# Patient Record
Sex: FEMALE | Race: AMERICAN INDIAN OR ALASKA NATIVE | NOT HISPANIC OR LATINO | Employment: OTHER | URBAN - NONMETROPOLITAN AREA
[De-identification: names, ages, dates, MRNs, and addresses within clinical notes are randomized per-mention and may not be internally consistent; named-entity substitution may affect disease eponyms.]

---

## 2021-04-19 ENCOUNTER — APPOINTMENT (OUTPATIENT)
Dept: URGENT CARE | Facility: PHYSICIAN GROUP | Age: 46
End: 2021-04-19
Payer: MEDICAID

## 2021-06-18 ENCOUNTER — OFFICE VISIT (OUTPATIENT)
Dept: URGENT CARE | Facility: PHYSICIAN GROUP | Age: 46
End: 2021-06-18
Payer: MEDICAID

## 2021-06-18 VITALS
BODY MASS INDEX: 31.32 KG/M2 | HEART RATE: 89 BPM | RESPIRATION RATE: 16 BRPM | HEIGHT: 65 IN | SYSTOLIC BLOOD PRESSURE: 132 MMHG | WEIGHT: 188 LBS | TEMPERATURE: 97.1 F | DIASTOLIC BLOOD PRESSURE: 86 MMHG | OXYGEN SATURATION: 98 %

## 2021-06-18 DIAGNOSIS — L08.9 LOCAL SKIN INFECTION: ICD-10-CM

## 2021-06-18 DIAGNOSIS — H00.012 HORDEOLUM EXTERNUM OF RIGHT LOWER EYELID: ICD-10-CM

## 2021-06-18 PROCEDURE — 99203 OFFICE O/P NEW LOW 30 MIN: CPT | Performed by: NURSE PRACTITIONER

## 2021-06-18 RX ORDER — ERYTHROMYCIN 5 MG/G
1 OINTMENT OPHTHALMIC 3 TIMES DAILY
Qty: 3.5 G | Refills: 0 | Status: SHIPPED | OUTPATIENT
Start: 2021-06-18 | End: 2021-06-25

## 2021-06-18 RX ORDER — SULFAMETHOXAZOLE AND TRIMETHOPRIM 800; 160 MG/1; MG/1
1 TABLET ORAL 2 TIMES DAILY
Qty: 14 TABLET | Refills: 0 | Status: SHIPPED | OUTPATIENT
Start: 2021-06-18 | End: 2021-06-25

## 2021-06-18 NOTE — PROGRESS NOTES
"  Chief Complaint   Patient presents with   • Bug Bite     multiple bug bites-very painful and itches    • Stye     (R) eye        HISTORY OF PRESENT ILLNESS: Patient is a pleasant 45 y.o. female who presents to urgent care today with complaints of a stye as well as multiple \"bug bites\".  She first noticed a painful, erythematous lesion to the right lower eyelid yesterday.  The area started draining white discharge and now feels improved.  Describes area is irritating.  She denies any eye symptoms.  She wears glasses, not contacts.  She has not any medication for symptom relief.  Furthermore, she notes \"bug bites\" to both her abdomen and buttocks over the past week.  The areas are tender with some drainage.  She denies any fever, chills, malaise.    Patient Active Problem List    Diagnosis Date Noted   • Postpartum care and examination of lactating mother 06/15/2016   • Pregnancy 05/03/2016   • GDM, class B1 03/31/2016   • Polyhydramnios in third trimester, antepartum complication 03/31/2016   • High-risk pregnancy in third trimester 03/17/2016   • Poor compliance 01/28/2016   • Pregnancy complicated by pre-existing type 2 diabetes 01/22/2016   • Diabetes (HCC) 12/30/2015   • AMA (advanced maternal age) multigravida 35+ 12/30/2015   • Morbidly obese ([facility]) 12/30/2015       Allergies:Levaquin    Current Outpatient Medications on File Prior to Visit   Medication Sig Dispense Refill   • METFORMIN HCL PO Take  by mouth.     • LOSARTAN POTASSIUM PO Take  by mouth.       No current facility-administered medications on file prior to visit.         Past Medical History:   Diagnosis Date   • Diabetes (HCC)    • GDM, class B1 3/31/2016   • Hypertension     preeclampsia    • Polyhydramnios in third trimester, antepartum complication 3/31/2016       Social History     Tobacco Use   • Smoking status: Never Smoker   • Smokeless tobacco: Never Used   Substance Use Topics   • Alcohol use: No   • Drug use: No       Family Status " "  Relation Name Status   • Mo  Alive   • Fa  Alive   • Sis 6 Alive   • Bro 1 Alive     Family History   Problem Relation Age of Onset   • Hypertension Mother    • Diabetes Mother    • Other Father    • Heart Disease Father        ROS:  Review of Systems   Constitutional: Negative for fever, chills, weight loss, malaise, and fatigue.   HENT: Negative for ear pain, nosebleeds, congestion, sore throat and neck pain.    Eyes: Positive for painful lesion to right lower lid.  Negative for vision changes.   Neuro: Negative for headache, sensory changes, weakness, seizure, LOC.   Cardiovascular: Negative for chest pain, palpitations, orthopnea and leg swelling.   Respiratory: Negative for cough, sputum production, shortness of breath and wheezing.   Gastrointestinal: Negative for abdominal pain, nausea, vomiting or diarrhea.   Genitourinary: Negative for dysuria, urgency and frequency.  Musculoskeletal: Negative for falls, neck pain, back pain, joint pain, myalgias.   Skin: Positive for painful \"bites\". negative for rash, diaphoresis.     Exam:  /86   Pulse 89   Temp 36.2 °C (97.1 °F)   Resp 16   Ht 1.651 m (5' 5\")   Wt 85.3 kg (188 lb)   SpO2 98%   General: well-nourished, well-developed female in NAD  Head: normocephalic, atraumatic  Eyes: PERRLA, no conjunctival injection, acuity grossly intact.  Right lower lid has 2 mm raised erythematous lesion, tender, no active drainage.  Ears: normal shape and symmetry, no tenderness, no discharge. External canals are without any significant edema or erythema. Tympanic membranes are without any inflammation, no effusion. Gross auditory acuity is intact.  Nose: symmetrical without tenderness, no discharge.  Mouth/Throat: reasonable hygiene, no erythema, exudates or tonsillar enlargement.  Neck: no masses, range of motion within normal limits, no tracheal deviation. No obvious thyroid enlargement.   Lymph: no cervical adenopathy. No supraclavicular adenopathy.   Neuro: " alert and oriented. Cranial nerves 1-12 grossly intact. No sensory deficit.   Cardiovascular: regular rate and rhythm. No edema.  Pulmonary: no distress. Chest is symmetrical with respiration, no wheezes, crackles, or rhonchi.   Musculoskeletal: no clubbing, appropriate muscle tone, gait is stable.  Skin: warm, dry, intact, no clubbing, no cyanosis, no rashes.  There are 2 circular areas of indurated erythema with central scabbing to abdomen and right buttock, measuring approximately 1 cm, no active drainage, no fluctuance.  Psych: appropriate mood, affect, judgement.         Assessment/Plan:  1. Hordeolum externum of right lower eyelid  erythromycin 5 MG/GM Ointment   2. Local skin infection  sulfamethoxazole-trimethoprim (BACTRIM DS) 800-160 MG tablet       Patient presents with both hordeolum and localized skin infection, discussed potential for relation.  Hand, skin, and eye care and hygiene discussed.  Erythromycin and Bactrim as directed.  Warm compresses to eye.  Supportive care, differential diagnoses, and indications for immediate follow-up discussed with patient.   Pathogenesis of diagnosis discussed including typical length and natural progression.   Instructed to return to clinic or nearest emergency department for any change in condition, further concerns, or worsening of symptoms.  Patient states understanding of the plan of care and discharge instructions.  Instructed to make an appointment, for follow up, with her primary care provider.        Please note that this dictation was created using voice recognition software. I have made every reasonable attempt to correct obvious errors, but I expect that there are errors of grammar and possibly content that I did not discover before finalizing the note.      MARYAM Gomez.

## 2021-10-07 ENCOUNTER — APPOINTMENT (OUTPATIENT)
Dept: URGENT CARE | Facility: PHYSICIAN GROUP | Age: 46
End: 2021-10-07
Payer: MEDICAID

## 2021-10-14 ENCOUNTER — APPOINTMENT (OUTPATIENT)
Dept: RADIOLOGY | Facility: IMAGING CENTER | Age: 46
End: 2021-10-14
Attending: NURSE PRACTITIONER
Payer: MEDICAID

## 2021-10-14 ENCOUNTER — OFFICE VISIT (OUTPATIENT)
Dept: URGENT CARE | Facility: PHYSICIAN GROUP | Age: 46
End: 2021-10-14
Payer: MEDICAID

## 2021-10-14 VITALS
RESPIRATION RATE: 16 BRPM | DIASTOLIC BLOOD PRESSURE: 86 MMHG | SYSTOLIC BLOOD PRESSURE: 126 MMHG | OXYGEN SATURATION: 96 % | HEART RATE: 67 BPM | TEMPERATURE: 98.2 F | BODY MASS INDEX: 31.16 KG/M2 | WEIGHT: 187 LBS | HEIGHT: 65 IN
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
## 2021-10-14 DIAGNOSIS — M25.562 ACUTE PAIN OF LEFT KNEE: ICD-10-CM

## 2021-10-14 DIAGNOSIS — M25.561 ACUTE PAIN OF RIGHT KNEE: ICD-10-CM

## 2021-10-14 DIAGNOSIS — M25.462 KNEE EFFUSION, LEFT: ICD-10-CM

## 2021-10-14 DIAGNOSIS — S86.912A KNEE STRAIN, LEFT, INITIAL ENCOUNTER: ICD-10-CM

## 2021-10-14 DIAGNOSIS — S80.01XA CONTUSION OF RIGHT KNEE, INITIAL ENCOUNTER: ICD-10-CM

## 2021-10-14 DIAGNOSIS — M25.559 HIP PAIN: ICD-10-CM

## 2021-10-14 PROCEDURE — 73501 X-RAY EXAM HIP UNI 1 VIEW: CPT | Mod: TC,FY,LT | Performed by: NURSE PRACTITIONER

## 2021-10-14 PROCEDURE — 99214 OFFICE O/P EST MOD 30 MIN: CPT | Performed by: NURSE PRACTITIONER

## 2021-10-14 PROCEDURE — 73564 X-RAY EXAM KNEE 4 OR MORE: CPT | Mod: TC,FY,RT | Performed by: NURSE PRACTITIONER

## 2021-10-14 PROCEDURE — 73564 X-RAY EXAM KNEE 4 OR MORE: CPT | Mod: TC,FY,LT | Performed by: NURSE PRACTITIONER

## 2021-10-14 RX ORDER — NAPROXEN 375 MG/1
375 TABLET ORAL 2 TIMES DAILY WITH MEALS
Qty: 28 TABLET | Refills: 0 | Status: SHIPPED | OUTPATIENT
Start: 2021-10-14 | End: 2021-10-28

## 2021-10-14 ASSESSMENT — ENCOUNTER SYMPTOMS: NUMBNESS: 0

## 2021-10-14 ASSESSMENT — PAIN SCALES - GENERAL: PAINLEVEL: 6=MODERATE PAIN

## 2021-10-14 NOTE — LETTER
October 14, 2021         Patient: Tamera Jha   YOB: 1975   Date of Visit: 10/14/2021           To Whom it May Concern:    Tamera Jha was seen in my clinic on 10/14/2021. She may return to work on 10/25/2021. Follow up with specialist for persistent symptoms.     If you have any questions or concerns, please don't hesitate to call.        Sincerely,           MARYAM Carter.  Electronically Signed

## 2021-10-15 ASSESSMENT — ENCOUNTER SYMPTOMS
JOINT SWELLING: 1
FALLS: 1
SENSORY CHANGE: 0
LOSS OF CONSCIOUSNESS: 0

## 2021-10-15 NOTE — PATIENT INSTRUCTIONS
Acute Knee Pain, Adult  Acute knee pain is sudden and may be caused by damage, swelling, or irritation of the muscles and tissues that support your knee. The injury may result from:  · A fall.  · An injury to your knee from twisting motions.  · A hit to the knee.  · Infection.  Acute knee pain may go away on its own with time and rest. If it does not, your health care provider may order tests to find the cause of the pain. These may include:  · Imaging tests, such as an X-ray, MRI, or ultrasound.  · Joint aspiration. In this test, fluid is removed from the knee.  · Arthroscopy. In this test, a lighted tube is inserted into the knee and an image is projected onto a TV screen.  · Biopsy. In this test, a sample of tissue is removed from the body and studied under a microscope.  Follow these instructions at home:  Pay attention to any changes in your symptoms. Take these actions to relieve your pain.  If you have a knee sleeve or brace:    · Wear the sleeve or brace as told by your health care provider. Remove it only as told by your health care provider.  · Loosen the sleeve or brace if your toes tingle, become numb, or turn cold and blue.  · Keep the sleeve or brace clean.  · If the sleeve or brace is not waterproof:  ? Do not let it get wet.  ? Cover it with a watertight covering when you take a bath or shower.  Activity  · Rest your knee.  · Do not do things that cause pain or make pain worse.  · Avoid high-impact activities or exercises, such as running, jumping rope, or doing jumping jacks.  · Work with a physical therapist to make a safe exercise program, as recommended by your health care provider. Do exercises as told by your physical therapist.  Managing pain, stiffness, and swelling    · If directed, put ice on the knee:  ? Put ice in a plastic bag.  ? Place a towel between your skin and the bag.  ? Leave the ice on for 20 minutes, 2-3 times a day.  · If directed, use an elastic bandage to put pressure  (compression) on your injured knee. This may control swelling, give support, and help with discomfort.  General instructions  · Take over-the-counter and prescription medicines only as told by your health care provider.  · Raise (elevate) your knee above the level of your heart when you are sitting or lying down.  · Sleep with a pillow under your knee.  · Do not use any products that contain nicotine or tobacco, such as cigarettes, e-cigarettes, and chewing tobacco. These can delay healing. If you need help quitting, ask your health care provider.  · If you are overweight, work with your health care provider and a dietitian to set a weight-loss goal that is healthy and reasonable for you. Extra weight can put pressure on your knee.  · Keep all follow-up visits as told by your health care provider. This is important.  Contact a health care provider if:  · Your knee pain continues, changes, or gets worse.  · You have a fever along with knee pain.  · Your knee feels warm to the touch.  · Your knee kanwal or locks up.  Get help right away if:  · Your knee swells, and the swelling becomes worse.  · You cannot move your knee.  · You have severe pain in your knee.  Summary  · Acute knee pain can be caused by a fall, an injury, an infection, or damage, swelling, or irritation of the tissues that support your knee.  · Your health care provider may perform tests to find out the cause of the pain.  · Pay attention to any changes in your symptoms. Relieve your pain with rest, medicines, light activity, and use of ice.  · Get help if your pain continues or becomes worse, your knee swells, or you cannot move your knee.  This information is not intended to replace advice given to you by your health care provider. Make sure you discuss any questions you have with your health care provider.  Document Released: 10/14/2008 Document Revised: 05/30/2019 Document Reviewed: 05/30/2019  Elsevier Patient Education © 2020 [employer]

## 2022-12-13 ENCOUNTER — NON-PROVIDER VISIT (OUTPATIENT)
Dept: URGENT CARE | Facility: PHYSICIAN GROUP | Age: 47
End: 2022-12-13

## 2022-12-13 DIAGNOSIS — Z11.1 PPD SCREENING TEST: ICD-10-CM

## 2022-12-13 PROCEDURE — 86580 TB INTRADERMAL TEST: CPT | Performed by: PHYSICIAN ASSISTANT

## 2022-12-14 NOTE — PROGRESS NOTES
Tamera Jha is a 47 y.o. female here for a non-provider visit for PPD placement -- Step 1 of 1    Reason for PPD:  work requirement    1. TB evaluation questionnaire completed by patient? Yes      -  If any answers marked yes did you contact a provider prior to placing? Not Indicated  2.  Patient notified to return to clinic for reading on: after 625pm 12/15/22 and before 625pm on 12/16/22  3.  PPD Placement documentation completed on TB evaluation questionnaire? yes  4.  Location of TB evaluation questionnaire filed: [facility]

## 2022-12-16 ENCOUNTER — NON-PROVIDER VISIT (OUTPATIENT)
Dept: URGENT CARE | Facility: PHYSICIAN GROUP | Age: 47
End: 2022-12-16

## 2022-12-16 LAB — TB WHEAL 3D P 5 TU DIAM: 0 MM

## 2022-12-16 NOTE — PROGRESS NOTES
Subjective     Tamera Jha is a 47 y.o. female who presents with PPD Reading (1st of 2)            Tamera Jha is a 47 y.o. female here for a non-provider visit for PPD reading -- Step 1 of 2.      1.  Resulted in Epic under enter/edit results? Yes   2.  TB evaluation questionnaire scanned into chart and original given to patient?Yes      3. Was induration greater than 0 mm? No.    If Step 1 of 2, when is patient returning for second step (delete if N/A): yes      Routed to PCP? No          ROS           Objective     There were no vitals taken for this visit.     Physical Exam                        Assessment & Plan        There are no diagnoses linked to this encounter.

## 2024-03-14 ENCOUNTER — OFFICE VISIT (OUTPATIENT)
Dept: URGENT CARE | Facility: PHYSICIAN GROUP | Age: 49
End: 2024-03-14
Payer: COMMERCIAL

## 2024-03-14 ENCOUNTER — HOSPITAL ENCOUNTER (INPATIENT)
Facility: MEDICAL CENTER | Age: 49
LOS: 7 days | DRG: 871 | End: 2024-03-21
Attending: EMERGENCY MEDICINE | Admitting: INTERNAL MEDICINE
Payer: COMMERCIAL

## 2024-03-14 ENCOUNTER — APPOINTMENT (OUTPATIENT)
Dept: RADIOLOGY | Facility: MEDICAL CENTER | Age: 49
DRG: 871 | End: 2024-03-14
Attending: EMERGENCY MEDICINE
Payer: COMMERCIAL

## 2024-03-14 ENCOUNTER — APPOINTMENT (OUTPATIENT)
Dept: RADIOLOGY | Facility: MEDICAL CENTER | Age: 49
DRG: 871 | End: 2024-03-14
Attending: INTERNAL MEDICINE
Payer: COMMERCIAL

## 2024-03-14 VITALS
HEIGHT: 65 IN | SYSTOLIC BLOOD PRESSURE: 108 MMHG | TEMPERATURE: 95.6 F | RESPIRATION RATE: 26 BRPM | BODY MASS INDEX: 31.65 KG/M2 | HEART RATE: 84 BPM | OXYGEN SATURATION: 100 % | WEIGHT: 190 LBS | DIASTOLIC BLOOD PRESSURE: 60 MMHG

## 2024-03-14 DIAGNOSIS — E11.10 DKA, TYPE 2, NOT AT GOAL (HCC): ICD-10-CM

## 2024-03-14 DIAGNOSIS — B95.1 BACTEREMIA DUE TO GROUP B STREPTOCOCCUS: ICD-10-CM

## 2024-03-14 DIAGNOSIS — K21.9 GASTROESOPHAGEAL REFLUX DISEASE, UNSPECIFIED WHETHER ESOPHAGITIS PRESENT: ICD-10-CM

## 2024-03-14 DIAGNOSIS — E11.10 DIABETIC KETOACIDOSIS WITHOUT COMA ASSOCIATED WITH TYPE 2 DIABETES MELLITUS (HCC): ICD-10-CM

## 2024-03-14 DIAGNOSIS — R68.89 FLU-LIKE SYMPTOMS: ICD-10-CM

## 2024-03-14 DIAGNOSIS — R79.89 ELEVATED LFTS: ICD-10-CM

## 2024-03-14 DIAGNOSIS — R40.2410 GLASGOW COMA SCALE TOTAL SCORE 13-15, UNSPECIFIED COMA TIMING: ICD-10-CM

## 2024-03-14 DIAGNOSIS — E11.65 TYPE 2 DIABETES MELLITUS WITH HYPERGLYCEMIA, WITHOUT LONG-TERM CURRENT USE OF INSULIN (HCC): ICD-10-CM

## 2024-03-14 DIAGNOSIS — E08.10 DIABETIC KETOACIDOSIS WITHOUT COMA ASSOCIATED WITH DIABETES MELLITUS DUE TO UNDERLYING CONDITION (HCC): ICD-10-CM

## 2024-03-14 DIAGNOSIS — R78.81 BACTEREMIA DUE TO GROUP B STREPTOCOCCUS: ICD-10-CM

## 2024-03-14 DIAGNOSIS — I10 HYPERTENSION, UNSPECIFIED TYPE: ICD-10-CM

## 2024-03-14 PROBLEM — R74.01 TRANSAMINITIS: Status: ACTIVE | Noted: 2024-03-14

## 2024-03-14 PROBLEM — D72.821 MONOCYTOSIS: Status: ACTIVE | Noted: 2024-03-14

## 2024-03-14 PROBLEM — K92.1 BLOOD IN STOOL: Status: ACTIVE | Noted: 2024-03-14

## 2024-03-14 LAB
ALBUMIN SERPL BCP-MCNC: 3.8 G/DL (ref 3.2–4.9)
ALBUMIN SERPL BCP-MCNC: 3.9 G/DL (ref 3.2–4.9)
ALBUMIN/GLOB SERPL: 1.5 G/DL
ALBUMIN/GLOB SERPL: 1.9 G/DL
ALP SERPL-CCNC: 201 U/L (ref 30–99)
ALP SERPL-CCNC: 223 U/L (ref 30–99)
ALT SERPL-CCNC: 101 U/L (ref 2–50)
ALT SERPL-CCNC: 98 U/L (ref 2–50)
AMPHET UR QL SCN: NEGATIVE
ANION GAP SERPL CALC-SCNC: 27 MMOL/L (ref 7–16)
ANION GAP SERPL CALC-SCNC: 31 MMOL/L (ref 7–16)
ANION GAP SERPL CALC-SCNC: 33 MMOL/L (ref 7–16)
ANION GAP SERPL CALC-SCNC: 34 MMOL/L (ref 7–16)
ANISOCYTOSIS BLD QL SMEAR: ABNORMAL
APPEARANCE UR: CLEAR
AST SERPL-CCNC: 199 U/L (ref 12–45)
AST SERPL-CCNC: 199 U/L (ref 12–45)
B-OH-BUTYR SERPL-MCNC: >8 MMOL/L (ref 0.02–0.27)
BACTERIA #/AREA URNS HPF: NEGATIVE /HPF
BARBITURATES UR QL SCN: NEGATIVE
BASOPHILS # BLD AUTO: 0.9 % (ref 0–1.8)
BASOPHILS # BLD AUTO: 2.6 % (ref 0–1.8)
BASOPHILS # BLD: 0.27 K/UL (ref 0–0.12)
BASOPHILS # BLD: 0.8 K/UL (ref 0–0.12)
BENZODIAZ UR QL SCN: NEGATIVE
BILIRUB SERPL-MCNC: 0.2 MG/DL (ref 0.1–1.5)
BILIRUB SERPL-MCNC: 0.2 MG/DL (ref 0.1–1.5)
BILIRUB UR QL STRIP.AUTO: NEGATIVE
BUN SERPL-MCNC: 21 MG/DL (ref 8–22)
BURR CELLS BLD QL SMEAR: NORMAL
BURR CELLS BLD QL SMEAR: NORMAL
BZE UR QL SCN: NEGATIVE
CALCIUM ALBUM COR SERPL-MCNC: 6.7 MG/DL (ref 8.5–10.5)
CALCIUM ALBUM COR SERPL-MCNC: 7.4 MG/DL (ref 8.5–10.5)
CALCIUM SERPL-MCNC: 6.5 MG/DL (ref 8.5–10.5)
CALCIUM SERPL-MCNC: 7 MG/DL (ref 8.5–10.5)
CALCIUM SERPL-MCNC: 7.3 MG/DL (ref 8.5–10.5)
CALCIUM SERPL-MCNC: 7.7 MG/DL (ref 8.5–10.5)
CANNABINOIDS UR QL SCN: NEGATIVE
CHLORIDE SERPL-SCNC: 103 MMOL/L (ref 96–112)
CHLORIDE SERPL-SCNC: 103 MMOL/L (ref 96–112)
CHLORIDE SERPL-SCNC: 104 MMOL/L (ref 96–112)
CHLORIDE SERPL-SCNC: 95 MMOL/L (ref 96–112)
CO2 SERPL-SCNC: 2 MMOL/L (ref 20–33)
CO2 SERPL-SCNC: 3 MMOL/L (ref 20–33)
COLOR UR: YELLOW
CREAT SERPL-MCNC: 0.74 MG/DL (ref 0.5–1.4)
CREAT SERPL-MCNC: 0.9 MG/DL (ref 0.5–1.4)
CREAT SERPL-MCNC: 0.91 MG/DL (ref 0.5–1.4)
CREAT SERPL-MCNC: 0.95 MG/DL (ref 0.5–1.4)
EKG IMPRESSION: NORMAL
EOSINOPHIL # BLD AUTO: 0 K/UL (ref 0–0.51)
EOSINOPHIL # BLD AUTO: 0.25 K/UL (ref 0–0.51)
EOSINOPHIL NFR BLD: 0 % (ref 0–6.9)
EOSINOPHIL NFR BLD: 0.8 % (ref 0–6.9)
EPI CELLS #/AREA URNS HPF: ABNORMAL /HPF
ERYTHROCYTE [DISTWIDTH] IN BLOOD BY AUTOMATED COUNT: 53.5 FL (ref 35.9–50)
ERYTHROCYTE [DISTWIDTH] IN BLOOD BY AUTOMATED COUNT: 56 FL (ref 35.9–50)
EST. AVERAGE GLUCOSE BLD GHB EST-MCNC: 272 MG/DL
ETHANOL BLD-MCNC: <10.1 MG/DL
FENTANYL UR QL: NEGATIVE
GFR SERPLBLD CREATININE-BSD FMLA CKD-EPI: 100 ML/MIN/1.73 M 2
GFR SERPLBLD CREATININE-BSD FMLA CKD-EPI: 74 ML/MIN/1.73 M 2
GFR SERPLBLD CREATININE-BSD FMLA CKD-EPI: 78 ML/MIN/1.73 M 2
GFR SERPLBLD CREATININE-BSD FMLA CKD-EPI: 79 ML/MIN/1.73 M 2
GLOBULIN SER CALC-MCNC: 2 G/DL (ref 1.9–3.5)
GLOBULIN SER CALC-MCNC: 2.6 G/DL (ref 1.9–3.5)
GLUCOSE BLD STRIP.AUTO-MCNC: 169 MG/DL (ref 65–99)
GLUCOSE BLD STRIP.AUTO-MCNC: 174 MG/DL (ref 65–99)
GLUCOSE BLD STRIP.AUTO-MCNC: 236 MG/DL (ref 65–99)
GLUCOSE BLD STRIP.AUTO-MCNC: 275 MG/DL (ref 65–99)
GLUCOSE BLD STRIP.AUTO-MCNC: 323 MG/DL (ref 65–99)
GLUCOSE BLD STRIP.AUTO-MCNC: 351 MG/DL (ref 65–99)
GLUCOSE BLD STRIP.AUTO-MCNC: 424 MG/DL (ref 65–99)
GLUCOSE BLD STRIP.AUTO-MCNC: 464 MG/DL (ref 65–99)
GLUCOSE BLD-MCNC: 472 MG/DL (ref 65–99)
GLUCOSE SERPL-MCNC: 244 MG/DL (ref 65–99)
GLUCOSE SERPL-MCNC: 305 MG/DL (ref 65–99)
GLUCOSE SERPL-MCNC: 365 MG/DL (ref 65–99)
GLUCOSE SERPL-MCNC: 485 MG/DL (ref 65–99)
GLUCOSE UR STRIP.AUTO-MCNC: >=1000 MG/DL
HAV IGM SERPL QL IA: NORMAL
HBA1C MFR BLD: 11.1 % (ref 4–5.6)
HBV CORE IGM SER QL: NORMAL
HBV SURFACE AG SER QL: NORMAL
HCG SERPL QL: NEGATIVE
HCT VFR BLD AUTO: 45.4 % (ref 37–47)
HCT VFR BLD AUTO: 49.7 % (ref 37–47)
HCV AB SER QL: NORMAL
HGB BLD-MCNC: 13.7 G/DL (ref 12–16)
HGB BLD-MCNC: 14.7 G/DL (ref 12–16)
HYALINE CASTS #/AREA URNS LPF: ABNORMAL /LPF
KETONES UR STRIP.AUTO-MCNC: >=160 MG/DL
LEUKOCYTE ESTERASE UR QL STRIP.AUTO: ABNORMAL
LYMPHOCYTES # BLD AUTO: 1.61 K/UL (ref 1–4.8)
LYMPHOCYTES # BLD AUTO: 3.41 K/UL (ref 1–4.8)
LYMPHOCYTES NFR BLD: 11.1 % (ref 22–41)
LYMPHOCYTES NFR BLD: 5.3 % (ref 22–41)
MACROCYTES BLD QL SMEAR: ABNORMAL
MAGNESIUM SERPL-MCNC: 2 MG/DL (ref 1.5–2.5)
MAGNESIUM SERPL-MCNC: 2.3 MG/DL (ref 1.5–2.5)
MAGNESIUM SERPL-MCNC: 2.6 MG/DL (ref 1.5–2.5)
MANUAL DIFF BLD: NORMAL
MANUAL DIFF BLD: NORMAL
MCH RBC QN AUTO: 30 PG (ref 27–33)
MCH RBC QN AUTO: 30.4 PG (ref 27–33)
MCHC RBC AUTO-ENTMCNC: 29.6 G/DL (ref 32.2–35.5)
MCHC RBC AUTO-ENTMCNC: 30.2 G/DL (ref 32.2–35.5)
MCV RBC AUTO: 102.7 FL (ref 81.4–97.8)
MCV RBC AUTO: 99.6 FL (ref 81.4–97.8)
METAMYELOCYTES NFR BLD MANUAL: 2.6 %
METHADONE UR QL SCN: NEGATIVE
MICRO URNS: ABNORMAL
MONOCYTES # BLD AUTO: 2.36 K/UL (ref 0–0.85)
MONOCYTES # BLD AUTO: 3.18 K/UL (ref 0–0.85)
MONOCYTES NFR BLD AUTO: 10.5 % (ref 0–13.4)
MONOCYTES NFR BLD AUTO: 7.7 % (ref 0–13.4)
MORPHOLOGY BLD-IMP: NORMAL
MORPHOLOGY BLD-IMP: NORMAL
MYELOCYTES NFR BLD MANUAL: 0.9 %
MYELOCYTES NFR BLD MANUAL: 3.4 %
NEUTROPHILS # BLD AUTO: 22.04 K/UL (ref 1.82–7.42)
NEUTROPHILS # BLD AUTO: 24.97 K/UL (ref 1.82–7.42)
NEUTROPHILS NFR BLD: 71.8 % (ref 44–72)
NEUTROPHILS NFR BLD: 80.7 % (ref 44–72)
NEUTS BAND NFR BLD MANUAL: 1.7 % (ref 0–10)
NITRITE UR QL STRIP.AUTO: NEGATIVE
NRBC # BLD AUTO: 0.05 K/UL
NRBC # BLD AUTO: 0.05 K/UL
NRBC BLD-RTO: 0.2 /100 WBC (ref 0–0.2)
NRBC BLD-RTO: 0.2 /100 WBC (ref 0–0.2)
OPIATES UR QL SCN: POSITIVE
OXYCODONE UR QL SCN: NEGATIVE
PCP UR QL SCN: NEGATIVE
PH UR STRIP.AUTO: 5 [PH] (ref 5–8)
PHOSPHATE SERPL-MCNC: 4.5 MG/DL (ref 2.5–4.5)
PHOSPHATE SERPL-MCNC: 6 MG/DL (ref 2.5–4.5)
PHOSPHATE SERPL-MCNC: 8 MG/DL (ref 2.5–4.5)
PLATELET # BLD AUTO: 358 K/UL (ref 164–446)
PLATELET # BLD AUTO: 367 K/UL (ref 164–446)
PLATELET BLD QL SMEAR: NORMAL
PLATELET BLD QL SMEAR: NORMAL
PMV BLD AUTO: 10 FL (ref 9–12.9)
PMV BLD AUTO: 10 FL (ref 9–12.9)
POIKILOCYTOSIS BLD QL SMEAR: NORMAL
POIKILOCYTOSIS BLD QL SMEAR: NORMAL
POTASSIUM SERPL-SCNC: 4.7 MMOL/L (ref 3.6–5.5)
POTASSIUM SERPL-SCNC: 5 MMOL/L (ref 3.6–5.5)
POTASSIUM SERPL-SCNC: 5.1 MMOL/L (ref 3.6–5.5)
POTASSIUM SERPL-SCNC: 5.3 MMOL/L (ref 3.6–5.5)
POTASSIUM SERPL-SCNC: 5.3 MMOL/L (ref 3.6–5.5)
PROCALCITONIN SERPL-MCNC: 0.06 NG/ML
PROPOXYPH UR QL SCN: NEGATIVE
PROT SERPL-MCNC: 5.8 G/DL (ref 6–8.2)
PROT SERPL-MCNC: 6.5 G/DL (ref 6–8.2)
PROT UR QL STRIP: 100 MG/DL
RBC # BLD AUTO: 4.56 M/UL (ref 4.2–5.4)
RBC # BLD AUTO: 4.84 M/UL (ref 4.2–5.4)
RBC # URNS HPF: ABNORMAL /HPF
RBC BLD AUTO: PRESENT
RBC BLD AUTO: PRESENT
RBC UR QL AUTO: ABNORMAL
SODIUM SERPL-SCNC: 132 MMOL/L (ref 135–145)
SODIUM SERPL-SCNC: 133 MMOL/L (ref 135–145)
SODIUM SERPL-SCNC: 137 MMOL/L (ref 135–145)
SODIUM SERPL-SCNC: 139 MMOL/L (ref 135–145)
SP GR UR STRIP.AUTO: 1.02
UROBILINOGEN UR STRIP.AUTO-MCNC: 0.2 MG/DL
WBC # BLD AUTO: 30.3 K/UL (ref 4.8–10.8)
WBC # BLD AUTO: 30.7 K/UL (ref 4.8–10.8)
WBC #/AREA URNS HPF: ABNORMAL /HPF

## 2024-03-14 PROCEDURE — 96374 THER/PROPH/DIAG INJ IV PUSH: CPT

## 2024-03-14 PROCEDURE — 84703 CHORIONIC GONADOTROPIN ASSAY: CPT

## 2024-03-14 PROCEDURE — 700111 HCHG RX REV CODE 636 W/ 250 OVERRIDE (IP): Mod: JZ,UD | Performed by: EMERGENCY MEDICINE

## 2024-03-14 PROCEDURE — 99291 CRITICAL CARE FIRST HOUR: CPT

## 2024-03-14 PROCEDURE — 700111 HCHG RX REV CODE 636 W/ 250 OVERRIDE (IP): Mod: JZ

## 2024-03-14 PROCEDURE — 82077 ASSAY SPEC XCP UR&BREATH IA: CPT

## 2024-03-14 PROCEDURE — 93005 ELECTROCARDIOGRAM TRACING: CPT | Performed by: INTERNAL MEDICINE

## 2024-03-14 PROCEDURE — 80053 COMPREHEN METABOLIC PANEL: CPT

## 2024-03-14 PROCEDURE — 700102 HCHG RX REV CODE 250 W/ 637 OVERRIDE(OP): Mod: UD | Performed by: EMERGENCY MEDICINE

## 2024-03-14 PROCEDURE — 770022 HCHG ROOM/CARE - ICU (200)

## 2024-03-14 PROCEDURE — 36415 COLL VENOUS BLD VENIPUNCTURE: CPT

## 2024-03-14 PROCEDURE — 82962 GLUCOSE BLOOD TEST: CPT | Performed by: NURSE PRACTITIONER

## 2024-03-14 PROCEDURE — 700105 HCHG RX REV CODE 258: Mod: UD | Performed by: EMERGENCY MEDICINE

## 2024-03-14 PROCEDURE — 82962 GLUCOSE BLOOD TEST: CPT | Mod: 91

## 2024-03-14 PROCEDURE — 85007 BL SMEAR W/DIFF WBC COUNT: CPT

## 2024-03-14 PROCEDURE — 3074F SYST BP LT 130 MM HG: CPT | Performed by: NURSE PRACTITIONER

## 2024-03-14 PROCEDURE — 83735 ASSAY OF MAGNESIUM: CPT

## 2024-03-14 PROCEDURE — 700111 HCHG RX REV CODE 636 W/ 250 OVERRIDE (IP): Performed by: INTERNAL MEDICINE

## 2024-03-14 PROCEDURE — 82010 KETONE BODYS QUAN: CPT

## 2024-03-14 PROCEDURE — 85027 COMPLETE CBC AUTOMATED: CPT

## 2024-03-14 PROCEDURE — 80074 ACUTE HEPATITIS PANEL: CPT

## 2024-03-14 PROCEDURE — 84145 PROCALCITONIN (PCT): CPT

## 2024-03-14 PROCEDURE — 71045 X-RAY EXAM CHEST 1 VIEW: CPT

## 2024-03-14 PROCEDURE — 87186 SC STD MICRODIL/AGAR DIL: CPT

## 2024-03-14 PROCEDURE — 84100 ASSAY OF PHOSPHORUS: CPT

## 2024-03-14 PROCEDURE — 700102 HCHG RX REV CODE 250 W/ 637 OVERRIDE(OP)

## 2024-03-14 PROCEDURE — 80048 BASIC METABOLIC PNL TOTAL CA: CPT

## 2024-03-14 PROCEDURE — 99291 CRITICAL CARE FIRST HOUR: CPT | Performed by: INTERNAL MEDICINE

## 2024-03-14 PROCEDURE — 3078F DIAST BP <80 MM HG: CPT | Performed by: NURSE PRACTITIONER

## 2024-03-14 PROCEDURE — 87040 BLOOD CULTURE FOR BACTERIA: CPT

## 2024-03-14 PROCEDURE — 700105 HCHG RX REV CODE 258

## 2024-03-14 PROCEDURE — 81001 URINALYSIS AUTO W/SCOPE: CPT

## 2024-03-14 PROCEDURE — A9270 NON-COVERED ITEM OR SERVICE: HCPCS

## 2024-03-14 PROCEDURE — 700102 HCHG RX REV CODE 250 W/ 637 OVERRIDE(OP): Performed by: INTERNAL MEDICINE

## 2024-03-14 PROCEDURE — 99215 OFFICE O/P EST HI 40 MIN: CPT | Performed by: NURSE PRACTITIONER

## 2024-03-14 PROCEDURE — 84132 ASSAY OF SERUM POTASSIUM: CPT

## 2024-03-14 PROCEDURE — 76705 ECHO EXAM OF ABDOMEN: CPT

## 2024-03-14 PROCEDURE — 87015 SPECIMEN INFECT AGNT CONCNTJ: CPT

## 2024-03-14 PROCEDURE — 83036 HEMOGLOBIN GLYCOSYLATED A1C: CPT

## 2024-03-14 PROCEDURE — A9270 NON-COVERED ITEM OR SERVICE: HCPCS | Performed by: INTERNAL MEDICINE

## 2024-03-14 PROCEDURE — 93010 ELECTROCARDIOGRAM REPORT: CPT | Performed by: INTERNAL MEDICINE

## 2024-03-14 PROCEDURE — 96375 TX/PRO/DX INJ NEW DRUG ADDON: CPT

## 2024-03-14 PROCEDURE — 80307 DRUG TEST PRSMV CHEM ANLYZR: CPT

## 2024-03-14 RX ORDER — OXYCODONE HYDROCHLORIDE 5 MG/1
TABLET ORAL
COMMUNITY
Start: 2024-02-02 | End: 2024-03-14

## 2024-03-14 RX ORDER — MORPHINE SULFATE 4 MG/ML
4 INJECTION INTRAVENOUS ONCE
Status: DISCONTINUED | OUTPATIENT
Start: 2024-03-14 | End: 2024-03-14

## 2024-03-14 RX ORDER — ENOXAPARIN SODIUM 100 MG/ML
40 INJECTION SUBCUTANEOUS DAILY
Status: DISCONTINUED | OUTPATIENT
Start: 2024-03-14 | End: 2024-03-16

## 2024-03-14 RX ORDER — MAGNESIUM SULFATE HEPTAHYDRATE 40 MG/ML
4 INJECTION, SOLUTION INTRAVENOUS
Status: COMPLETED | OUTPATIENT
Start: 2024-03-14 | End: 2024-03-15

## 2024-03-14 RX ORDER — ONDANSETRON 2 MG/ML
4 INJECTION INTRAMUSCULAR; INTRAVENOUS ONCE
Status: COMPLETED | OUTPATIENT
Start: 2024-03-14 | End: 2024-03-14

## 2024-03-14 RX ORDER — MAGNESIUM SULFATE HEPTAHYDRATE 40 MG/ML
2 INJECTION, SOLUTION INTRAVENOUS
Status: COMPLETED | OUTPATIENT
Start: 2024-03-14 | End: 2024-03-15

## 2024-03-14 RX ORDER — INSULIN GLARGINE-YFGN 100 [IU]/ML
15 INJECTION, SOLUTION SUBCUTANEOUS DAILY
Status: ON HOLD | COMMUNITY
Start: 2024-02-24 | End: 2024-03-21

## 2024-03-14 RX ORDER — DEXTROSE AND SODIUM CHLORIDE 10; .45 G/100ML; G/100ML
INJECTION, SOLUTION INTRAVENOUS CONTINUOUS
Status: ACTIVE | OUTPATIENT
Start: 2024-03-14 | End: 2024-03-16

## 2024-03-14 RX ORDER — LABETALOL HYDROCHLORIDE 5 MG/ML
10 INJECTION, SOLUTION INTRAVENOUS EVERY 4 HOURS PRN
Status: DISCONTINUED | OUTPATIENT
Start: 2024-03-14 | End: 2024-03-21 | Stop reason: HOSPADM

## 2024-03-14 RX ORDER — AMOXICILLIN 250 MG
2 CAPSULE ORAL 2 TIMES DAILY PRN
Status: DISCONTINUED | OUTPATIENT
Start: 2024-03-14 | End: 2024-03-21 | Stop reason: HOSPADM

## 2024-03-14 RX ORDER — MORPHINE SULFATE 4 MG/ML
2 INJECTION INTRAVENOUS ONCE
Status: COMPLETED | OUTPATIENT
Start: 2024-03-14 | End: 2024-03-14

## 2024-03-14 RX ORDER — POLYETHYLENE GLYCOL 3350 17 G/17G
1 POWDER, FOR SOLUTION ORAL
Status: DISCONTINUED | OUTPATIENT
Start: 2024-03-14 | End: 2024-03-21 | Stop reason: HOSPADM

## 2024-03-14 RX ORDER — POTASSIUM CHLORIDE 7.45 MG/ML
10 INJECTION INTRAVENOUS ONCE
Status: COMPLETED | OUTPATIENT
Start: 2024-03-14 | End: 2024-03-14

## 2024-03-14 RX ORDER — SODIUM CHLORIDE 9 MG/ML
1000 INJECTION, SOLUTION INTRAVENOUS ONCE
Status: COMPLETED | OUTPATIENT
Start: 2024-03-14 | End: 2024-03-14

## 2024-03-14 RX ORDER — SODIUM CHLORIDE, SODIUM LACTATE, POTASSIUM CHLORIDE, AND CALCIUM CHLORIDE .6; .31; .03; .02 G/100ML; G/100ML; G/100ML; G/100ML
2000 INJECTION, SOLUTION INTRAVENOUS ONCE
Status: COMPLETED | OUTPATIENT
Start: 2024-03-14 | End: 2024-03-14

## 2024-03-14 RX ORDER — ACETAMINOPHEN 325 MG/1
650 TABLET ORAL EVERY 6 HOURS PRN
Status: DISCONTINUED | OUTPATIENT
Start: 2024-03-14 | End: 2024-03-21 | Stop reason: HOSPADM

## 2024-03-14 RX ORDER — CALCIUM GLUCONATE 20 MG/ML
2 INJECTION, SOLUTION INTRAVENOUS ONCE
Status: COMPLETED | OUTPATIENT
Start: 2024-03-14 | End: 2024-03-14

## 2024-03-14 RX ORDER — DEXTROSE AND SODIUM CHLORIDE 5; .45 G/100ML; G/100ML
INJECTION, SOLUTION INTRAVENOUS CONTINUOUS
Status: DISCONTINUED | OUTPATIENT
Start: 2024-03-14 | End: 2024-03-15

## 2024-03-14 RX ORDER — POTASSIUM CHLORIDE 7.45 MG/ML
10 INJECTION INTRAVENOUS ONCE
Status: COMPLETED | OUTPATIENT
Start: 2024-03-14 | End: 2024-03-15

## 2024-03-14 RX ORDER — NYSTATIN 100000 [USP'U]/G
POWDER TOPICAL 2 TIMES DAILY
Status: COMPLETED | OUTPATIENT
Start: 2024-03-14 | End: 2024-03-19

## 2024-03-14 RX ORDER — SODIUM CHLORIDE, SODIUM LACTATE, POTASSIUM CHLORIDE, CALCIUM CHLORIDE 600; 310; 30; 20 MG/100ML; MG/100ML; MG/100ML; MG/100ML
INJECTION, SOLUTION INTRAVENOUS CONTINUOUS
Status: DISCONTINUED | OUTPATIENT
Start: 2024-03-14 | End: 2024-03-15

## 2024-03-14 RX ADMIN — SODIUM CHLORIDE, POTASSIUM CHLORIDE, SODIUM LACTATE AND CALCIUM CHLORIDE 2000 ML: 600; 310; 30; 20 INJECTION, SOLUTION INTRAVENOUS at 15:22

## 2024-03-14 RX ADMIN — ONDANSETRON 4 MG: 2 INJECTION INTRAMUSCULAR; INTRAVENOUS at 11:46

## 2024-03-14 RX ADMIN — SODIUM CHLORIDE 1000 ML: 9 INJECTION, SOLUTION INTRAVENOUS at 11:40

## 2024-03-14 RX ADMIN — CALCIUM GLUCONATE 2 G: 20 INJECTION, SOLUTION INTRAVENOUS at 17:56

## 2024-03-14 RX ADMIN — MORPHINE SULFATE 2 MG: 4 INJECTION, SOLUTION INTRAMUSCULAR; INTRAVENOUS at 11:46

## 2024-03-14 RX ADMIN — MORPHINE SULFATE 2 MG: 4 INJECTION, SOLUTION INTRAMUSCULAR; INTRAVENOUS at 23:08

## 2024-03-14 RX ADMIN — SODIUM CHLORIDE 1000 ML: 9 INJECTION, SOLUTION INTRAVENOUS at 12:53

## 2024-03-14 RX ADMIN — ACETAMINOPHEN 650 MG: 325 TABLET, FILM COATED ORAL at 16:18

## 2024-03-14 RX ADMIN — POTASSIUM CHLORIDE 10 MEQ: 7.46 INJECTION, SOLUTION INTRAVENOUS at 23:16

## 2024-03-14 RX ADMIN — NYSTATIN: 100000 POWDER TOPICAL at 17:59

## 2024-03-14 RX ADMIN — DEXTROSE AND SODIUM CHLORIDE 900 ML: 5; 450 INJECTION, SOLUTION INTRAVENOUS at 19:25

## 2024-03-14 RX ADMIN — POTASSIUM CHLORIDE 10 MEQ: 7.46 INJECTION, SOLUTION INTRAVENOUS at 18:11

## 2024-03-14 RX ADMIN — ACETAMINOPHEN 650 MG: 325 TABLET, FILM COATED ORAL at 22:26

## 2024-03-14 RX ADMIN — SODIUM CHLORIDE, POTASSIUM CHLORIDE, SODIUM LACTATE AND CALCIUM CHLORIDE: 600; 310; 30; 20 INJECTION, SOLUTION INTRAVENOUS at 16:23

## 2024-03-14 RX ADMIN — SODIUM CHLORIDE 4 UNITS/HR: 9 INJECTION, SOLUTION INTRAVENOUS at 16:15

## 2024-03-14 RX ADMIN — ENOXAPARIN SODIUM 40 MG: 100 INJECTION SUBCUTANEOUS at 17:59

## 2024-03-14 RX ADMIN — INSULIN HUMAN 5 UNITS: 100 INJECTION, SOLUTION PARENTERAL at 12:53

## 2024-03-14 ASSESSMENT — ENCOUNTER SYMPTOMS
CHILLS: 1
NUMBER OF EPISODES OF EMESIS TODAY: 1
WEIGHT LOSS: 0
CHILLS: 1
HEMOPTYSIS: 0
NAUSEA: 1
HEARTBURN: 0
NAUSEA: 1
VOMITING: 1
FEVER: 0
VOMITING: 1
ABDOMINAL PAIN: 1
CONSTIPATION: 0
DIZZINESS: 1
MYALGIAS: 1
ABDOMINAL PAIN: 1
SPUTUM PRODUCTION: 0
FEVER: 1
SEIZURES: 0
COUGH: 1
BLOOD IN STOOL: 1
SHORTNESS OF BREATH: 0
LOSS OF CONSCIOUSNESS: 0
HEADACHES: 1
HEADACHES: 0
DIARRHEA: 0
DIARRHEA: 1
DIZZINESS: 1
PALPITATIONS: 0

## 2024-03-14 ASSESSMENT — PAIN DESCRIPTION - PAIN TYPE
TYPE: ACUTE PAIN;CHRONIC PAIN
TYPE: ACUTE PAIN
TYPE: ACUTE PAIN;CHRONIC PAIN

## 2024-03-14 ASSESSMENT — LIFESTYLE VARIABLES: SUBSTANCE_ABUSE: 1

## 2024-03-14 ASSESSMENT — FIBROSIS 4 INDEX: FIB4 SCORE: 2.64

## 2024-03-14 NOTE — ED PROVIDER NOTES
ED Provider Note    CHIEF COMPLAINT  Chief Complaint   Patient presents with    High Blood Sugar       EXTERNAL RECORDS REVIEWED  Reviewed previous laboratory studies, outpatient clinic visits medication list    HPI/ROS  LIMITATION TO HISTORY   None  OUTSIDE HISTORIAN(S):  None    Tamera Jha is a 48 y.o. female who presents evaluation of nausea breathing heavy and not feeling well abdominal discomfort.  The patient has a history of diabetes.  She was apparently just transition from metformin and oral hypoglycemics to insulin therapy.  This is new for her.  She was seen at PCPs office today and she had profoundly elevated blood sugar and appeared ill and sluggish and therefore she was emergently transported here for higher level of care.  She is a very poor historian.  She reports poor compliance and use of her insulin as this is new for her.  She denies high fever or headache.    PAST MEDICAL HISTORY   has a past medical history of Diabetes (HCC), GDM, class B1 (3/31/2016), Hypertension, and Polyhydramnios in third trimester, antepartum complication (3/31/2016).    SURGICAL HISTORY   has a past surgical history that includes tonsillectomy; rhinoplasty; appendectomy; and cholecystectomy.    FAMILY HISTORY  Family History   Problem Relation Age of Onset    Hypertension Mother     Diabetes Mother     Other Father     Heart Disease Father        SOCIAL HISTORY  Social History     Tobacco Use    Smoking status: Never    Smokeless tobacco: Never   Substance and Sexual Activity    Alcohol use: No    Drug use: No    Sexual activity: Yes     Partners: Male     Comment: none        CURRENT MEDICATIONS  Home Medications       Reviewed by Heydi Nieves (Pharmacy Tech) on 03/14/24 at 1329  Med List Status: Complete     Medication Last Dose Status   Insulin Glargine-yfgn 100 UNIT/ML Solution Pen-injector 3/12/2024 Active                    Current Facility-Administered Medications:     LR (Bolus) infusion 2,000 mL,  "2,000 mL, Intravenous, Once, Adán Box D.O.    D10%-0.45% NaCl infusion, , Intravenous, Continuous, Adán Box D.O.    MD ALERT-PHARMACY TO CONSULT FOR DKA MONITORING 1 Each, 1 Each, Other, PRN, Adán Box D.O.    magnesium sulfate IVPB premix 2 g, 2 g, Intravenous, Once PRN **OR** magnesium sulfate IVPB premix 4 g, 4 g, Intravenous, Once PRN, Adán Box D.O.    potassium phosphate 30 mmol in  mL ivpb, 30 mmol, Intravenous, Once PRN **OR** sodium phosphate 30 mmol in 1/2  mL ivpb, 30 mmol, Intravenous, Once PRN, Adán Box D.O.    Adult DKA potassium(K+) replacement scale, 1 Each, Intravenous, Q4HRS, Adán Box D.O.    lactated ringers infusion, , Intravenous, Continuous, MIKA AmesO.    D5 1/2 NS infusion, , Intravenous, Continuous, MIKA AmesO.    enoxaparin (Lovenox) inj 40 mg, 40 mg, Subcutaneous, DAILY AT 1800, Adán Box D.O.    acetaminophen (Tylenol) tablet 650 mg, 650 mg, Oral, Q6HRS PRN, Adán Box D.O.    senna-docusate (Pericolace Or Senokot S) 8.6-50 MG per tablet 2 Tablet, 2 Tablet, Oral, BID PRN **AND** polyethylene glycol/lytes (Miralax) Packet 1 Packet, 1 Packet, Oral, QDAY PRN, Adán Box D.O.    labetalol (Normodyne/Trandate) injection 10 mg, 10 mg, Intravenous, Q4HRS PRN, MIKA AmesO.    insulin regular (Humulin R) 100 Units in  mL Infusion for DKA, 4 Units/hr, Intravenous, Continuous, MIKA AmesOGarth    Current Outpatient Medications:     Insulin Glargine-yfgn 100 UNIT/ML Solution Pen-injector, Inject 15 Units under the skin every day., Disp: , Rfl:     ALLERGIES  Allergies   Allergen Reactions    Levaquin [Levofloxacin] Hives       PHYSICAL EXAM  VITAL SIGNS: /66   Pulse 83   Temp 36.4 °C (97.6 °F) (Temporal)   Resp (!) 27   Ht 1.651 m (5' 5\")   Wt 86.6 kg (191 lb)   SpO2 100%   BMI 31.78 kg/m²    Pulse ox interpretation: I interpret this pulse ox as normal.  Constitutional: " Alert and oriented x 3, ill-appearing moderate distress  HEENT: Atraumatic normocephalic, pupils are equal round reactive to light extraocular movements are intact. The nares is clear, external ears are normal, mouth shows dry mucous membranes normal dentition for age  Neck: Supple, no JVD no tracheal deviation  Cardiovascular: Regular rate and rhythm no murmur rub or gallop 2+ pulses peripherally x4  Thorax & Lungs: Tachypneic increased work of breathing no respiratory distress, no wheezes rales or rhonchi, No chest tenderness.   GI: Soft nontender nondistended positive bowel sounds, no peritoneal signs  Skin: Warm dry no acute rash or lesion  Musculoskeletal: Moving all extremities with full range and 5 of 5 strength no acute  deformity  Neurologic: Cranial nerves III through XII are grossly intact no sensory deficit no cerebellar dysfunction   Psychiatric: Sluggish          DIAGNOSTIC STUDIES / PROCEDURES    LABS  Results for orders placed or performed during the hospital encounter of 03/14/24   CBC with Differential   Result Value Ref Range    WBC 30.7 (H) 4.8 - 10.8 K/uL    RBC 4.84 4.20 - 5.40 M/uL    Hemoglobin 14.7 12.0 - 16.0 g/dL    Hematocrit 49.7 (H) 37.0 - 47.0 %    .7 (H) 81.4 - 97.8 fL    MCH 30.4 27.0 - 33.0 pg    MCHC 29.6 (L) 32.2 - 35.5 g/dL    RDW 56.0 (H) 35.9 - 50.0 fL    Platelet Count 367 164 - 446 K/uL    MPV 10.0 9.0 - 12.9 fL    Neutrophils-Polys 71.80 44.00 - 72.00 %    Lymphocytes 11.10 (L) 22.00 - 41.00 %    Monocytes 7.70 0.00 - 13.40 %    Eosinophils 0.80 0.00 - 6.90 %    Basophils 2.60 (H) 0.00 - 1.80 %    Nucleated RBC 0.20 0.00 - 0.20 /100 WBC    Neutrophils (Absolute) 22.04 (H) 1.82 - 7.42 K/uL    Lymphs (Absolute) 3.41 1.00 - 4.80 K/uL    Monos (Absolute) 2.36 (H) 0.00 - 0.85 K/uL    Eos (Absolute) 0.25 0.00 - 0.51 K/uL    Baso (Absolute) 0.80 (H) 0.00 - 0.12 K/uL    NRBC (Absolute) 0.05 K/uL    Anisocytosis 1+     Macrocytosis 1+    Comp Metabolic Panel   Result Value Ref  Range    Sodium 132 (L) 135 - 145 mmol/L    Potassium 5.3 3.6 - 5.5 mmol/L    Chloride 95 (L) 96 - 112 mmol/L    Co2 3 (LL) 20 - 33 mmol/L    Anion Gap 34.0 (H) 7.0 - 16.0    Glucose 485 (H) 65 - 99 mg/dL    Bun 21 8 - 22 mg/dL    Creatinine 0.95 0.50 - 1.40 mg/dL    Calcium 7.3 (L) 8.5 - 10.5 mg/dL    Correct Calcium 7.4 (L) 8.5 - 10.5 mg/dL    AST(SGOT) 199 (H) 12 - 45 U/L    ALT(SGPT) 101 (H) 2 - 50 U/L    Alkaline Phosphatase 223 (H) 30 - 99 U/L    Total Bilirubin 0.2 0.1 - 1.5 mg/dL    Albumin 3.9 3.2 - 4.9 g/dL    Total Protein 6.5 6.0 - 8.2 g/dL    Globulin 2.6 1.9 - 3.5 g/dL    A-G Ratio 1.5 g/dL   URINALYSIS    Specimen: Urine   Result Value Ref Range    Color Yellow     Character Clear     Specific Gravity 1.022 <1.035    Ph 5.0 5.0 - 8.0    Glucose >=1000 (A) Negative mg/dL    Ketones >=160 Negative mg/dL    Protein 100 (A) Negative mg/dL    Bilirubin Negative Negative    Urobilinogen, Urine 0.2 Negative    Nitrite Negative Negative    Leukocyte Esterase Trace (A) Negative    Occult Blood Trace (A) Negative    Micro Urine Req Microscopic    BETA-HYDROXYBUTYRIC ACID   Result Value Ref Range    beta-Hydroxybutyric Acid >8.00 (H) 0.02 - 0.27 mmol/L   MAGNESIUM   Result Value Ref Range    Magnesium 2.6 (H) 1.5 - 2.5 mg/dL   PHOSPHORUS   Result Value Ref Range    Phosphorus 8.0 (H) 2.5 - 4.5 mg/dL   DIFFERENTIAL MANUAL   Result Value Ref Range    Metamyelocytes 2.60 %    Myelocytes 3.40 %    Manual Diff Status PERFORMED    PERIPHERAL SMEAR REVIEW   Result Value Ref Range    Peripheral Smear Review see below    PLATELET ESTIMATE   Result Value Ref Range    Plt Estimation Normal    MORPHOLOGY   Result Value Ref Range    RBC Morphology Present     Poikilocytosis 1+     Echinocytes 1+    PROCALCITONIN   Result Value Ref Range    Procalcitonin 0.06 <0.25 ng/mL   ESTIMATED GFR   Result Value Ref Range    GFR (CKD-EPI) 74 >60 mL/min/1.73 m 2   URINE MICROSCOPIC (W/UA)   Result Value Ref Range    WBC 5-10 (A) /hpf     RBC 0-2 /hpf    Bacteria Negative None /hpf    Epithelial Cells Few /hpf    Hyaline Cast 0-2 /lpf   POCT glucose device results   Result Value Ref Range    POC Glucose, Blood 464 (HH) 65 - 99 mg/dL   POCT glucose device results   Result Value Ref Range    POC Glucose, Blood 424 (HH) 65 - 99 mg/dL         RADIOLOGY  I have independently interpreted the diagnostic imaging associated with this visit and am waiting the final reading from the radiologist.   My preliminary interpretation is as follows: No evidence of pneumonia  Radiologist interpretation:   DX-CHEST-PORTABLE (1 VIEW)   Final Result      1.  There is no acute cardiopulmonary process.          COURSE & MEDICAL DECISION MAKING    ED Observation Status? No; Patient does not meet criteria for ED Observation.     INITIAL ASSESSMENT, COURSE AND PLAN  Care Narrative:     This is a critically ill 48-year-old female who was sent over from urgent care for profoundly elevated blood sugar breathing heavy and generally not feeling well.  I reviewed her records.  She is currently transitioning off of metformin to insulin.  She had previously been on insulin several years ago but was clear on how to best utilize the medication.  Urgent care was concerned that she was in diabetic ketoacidosis.  2 large-bore peripheral IVs were established.  I ordered and administered 2 L of normal saline crystalloid.  Here her workup suggest fulminant diabetic ketoacidosis with profound metabolic acidosis and elevated beta hydroxybutyric acid.  White blood cell count is significant elevated I have no clear source of infection.  I will add on blood cultures and procalcitonin.  IV insulin has been ordered.  Patient is critically ill.  Consultation with the intensivist service was obtained.  The patient will be admitted to the ICU in guarded condition  HYDRATION: Based on the patient's presentation of Acute Vomiting and Hyperglycemia the patient was given IV fluids. IV Hydration was used  because oral hydration was not adequate alone. Upon recheck following hydration, the patient was improved.      ADDITIONAL PROBLEM LIST    DISPOSITION AND DISCUSSIONS  I have discussed management of the patient with the following physicians and REJI's: Discussed with intensivist    Discussion of management with other QHP or appropriate source(s): Conferred with ER pharmacist    Escalation of care considered, and ultimately not performed: Considered imaging    Barriers to care at this time, including but not limited to: Patient is a poor historian.     Decision tools and prescription drugs considered including, but not limited to: None.    FINAL DIAGNOSIS  1. Diabetic ketoacidosis without coma associated with diabetes mellitus due to underlying condition (HCC)        2. Sara coma scale total score 13-15, unspecified coma timing              CRITICAL CARE  The very real possibilty of a deterioration of this patient's condition required the highest level of my preparedness for sudden, emergent intervention.  I provided critical care services, which included medication orders, frequent reevaluations of the patient's condition and response to treatment, ordering and reviewing test results, and discussing the case with various consultants.  The critical care time associated with the care of the patient was 40 minutes. Review chart for interventions. This time is exclusive of any other billable procedures.          Electronically signed by: Baldev Wilhelm M.D., 3/14/2024 11:51 AM

## 2024-03-14 NOTE — ED NOTES
from  for hyperglycemia. Pt diabetes meds were changed approx 5 days ago and she has not taken them. Pt is noted to be tachypniec and thirsty. BGL at  537 and BGL via . Otherwise VSS. Pt is A&O. Connected to BS monitor and call light in reach

## 2024-03-14 NOTE — CONSULTS
[facility] Internal Medicine History & Physical Note    Date of Service  3/14/2024    [facility] Team: [facility] ICU Gold Team   Attending: Jeremy M Gonda, M.d.  Senior Resident: Dr. Adán Box  Contact Number: [phone]    Primary Care Physician  Ryan Becerril M.D.    Consultants  N/a    Specialist Names: n/a    Code Status  Full Code    Chief Complaint  Chief Complaint   Patient presents with    High Blood Sugar       History of Presenting Illness (HPI):   Tamera Jha is a 48 y.o. female who presented 3/14/2024 with diabetes mellitus type 2 (last A1c of 7.0 approximately 8 years ago).   She states she has been working with her primary care physician in order to decrease insulin therapy and transition to oral medications only such as metformin.  She states she did this approximately 5 to 7 days ago.  She denies having any insulin since and that her glucose has been steadily rising and has not been falling throughout the day.  States several days ago she started having worsening nausea and vomiting and is now unable to eat at all.  Also reports worsening diarrhea with bloody/yellow color, and also burning with urination and increased urinary frequency.  Denies any chest pain, shortness of breath, cough, fevers, change in weight, lower extremity swelling, pain with defecation.  ER course significant for tachypnea, mild hypertension.  But overall stable vitals and saturating well on room air.  Received IV fluids and small insulin dose in the emergency room.  Labs showing blood glucose approximately 450, elevated beta hydroxybutyrate, significant acidosis present.      I discussed the plan of care with patient.    Review of Systems  Review of Systems   Constitutional:  Positive for chills and malaise/fatigue. Negative for fever and weight loss.   Respiratory:  Positive for cough. Negative for hemoptysis, sputum production and shortness of breath.    Cardiovascular:  Negative for chest pain, palpitations and leg swelling.    Gastrointestinal:  Positive for abdominal pain, nausea and vomiting. Negative for constipation, diarrhea, heartburn and melena (Described stool is yellowish in appearance.).   Genitourinary:  Positive for dysuria and frequency.   Neurological:  Positive for dizziness. Negative for seizures, loss of consciousness and headaches.   Psychiatric/Behavioral:  Positive for substance abuse.        Past Medical History   has a past medical history of Diabetes (HCC), GDM, class B1 (3/31/2016), Hypertension, and Polyhydramnios in third trimester, antepartum complication (3/31/2016).    Surgical History   has a past surgical history that includes tonsillectomy; rhinoplasty; appendectomy; and cholecystectomy.     Family History  family history includes Diabetes in her mother; Heart Disease in her father; Hypertension in her mother; Other in her father.   Family history reviewed with patient.     Social History  Tobacco: Denies  Alcohol: States social  Recreational drugs (illegal or prescription): Cannabis every day      Allergies  Allergies   Allergen Reactions    Levaquin [Levofloxacin] Hives       Medications  Prior to Admission Medications   Prescriptions Last Dose Informant Patient Reported? Taking?   Insulin Glargine-yfgn 100 UNIT/ML Solution Pen-injector 3/12/2024 at AM Patient, Patient's Home Pharmacy Yes No   Sig: Inject 15 Units under the skin every day.      Facility-Administered Medications: None       Physical Exam  Temp:  [32.9 °C (91.2 °F)-36.4 °C (97.6 °F)] 32.9 °C (91.2 °F)  Pulse:  [81-92] 92  Resp:  [25-34] 25  BP: (108-149)/(60-76) 123/65  SpO2:  [99 %-100 %] 99 %  Blood Pressure: 129/66   Temperature: 36.4 °C (97.6 °F)   Pulse: 86   Respiration: (!) 28   Pulse Oximetry: 100 %       Physical Exam  Vitals and nursing note reviewed.   Constitutional:       General: She is not in acute distress.     Appearance: She is ill-appearing.   HENT:      Head: Normocephalic and atraumatic.      Right Ear: External ear  "normal.      Left Ear: External ear normal.   Eyes:      General: No scleral icterus.     Conjunctiva/sclera: Conjunctivae normal.   Cardiovascular:      Rate and Rhythm: Regular rhythm. Tachycardia present.      Pulses: Normal pulses.      Heart sounds: Normal heart sounds. No murmur heard.  Pulmonary:      Effort: Pulmonary effort is normal. No respiratory distress.      Breath sounds: No stridor. No wheezing, rhonchi or rales.   Chest:      Chest wall: No tenderness.   Abdominal:      General: Abdomen is flat. There is no distension.      Palpations: There is no mass.      Tenderness: There is abdominal tenderness. There is no guarding or rebound.      Hernia: No hernia is present.   Musculoskeletal:         General: No deformity or signs of injury.      Right lower leg: No edema.      Left lower leg: No edema.   Skin:     General: Skin is warm and dry.      Coloration: Skin is not jaundiced or pale.   Neurological:      General: No focal deficit present.      Mental Status: She is alert and oriented to person, place, and time. Mental status is at baseline.   Psychiatric:         Mood and Affect: Mood normal.         Behavior: Behavior normal.         Thought Content: Thought content normal.         Judgment: Judgment normal.         Laboratory:  Recent Labs     03/14/24  1126   WBC 30.7*   RBC 4.84   HEMOGLOBIN 14.7   HEMATOCRIT 49.7*   .7*   MCH 30.4   MCHC 29.6*   RDW 56.0*   PLATELETCT 367   MPV 10.0     Recent Labs     03/14/24  1126   SODIUM 132*   POTASSIUM 5.3   CHLORIDE 95*   CO2 3*   GLUCOSE 485*   BUN 21   CREATININE 0.95   CALCIUM 7.3*     Recent Labs     03/14/24  1126   ALTSGPT 101*   ASTSGOT 199*   ALKPHOSPHAT 223*   TBILIRUBIN 0.2   GLUCOSE 485*         No results for input(s): \"NTPROBNP\" in the last 72 hours.      No results for input(s): \"TROPONINT\" in the last 72 hours.    Imaging:  DX-CHEST-PORTABLE (1 VIEW)   Final Result      1.  There is no acute cardiopulmonary process.      US-RUQ  "   (Results Pending)       X-Ray:  I have personally reviewed the images and compared with prior images.    Assessment/Plan:  Problem Representation:  Danielle Jha is a 48-year-old female with past medical history of poorly controlled diabetes presenting with diabetic ketoacidosis.  I anticipate this patient will require at least two midnights for appropriate medical management, necessitating inpatient admission because diabetic ketoacidosis management including frequent labs and IV insulin drip.    Patient will need a ICU (Adult and Pediatrics) bed on MEDICAL service .  The need is secondary to diabetic ketoacidosis management including frequent electrolyte monitoring insulin drip.    * DKA, type 2, not at goal (HCC)- (present on admission)  Assessment & Plan  Bicarb of 3, anion gap in the 30s on admission, beta hydroxybutyrate greater than 8 (unable to detect), point-of-care glucoses approximately 450.  A1c of 11 on admission.  Appears poorly controlled and likely patient will need insulin therapy upon discharge and likely indefinitely.   -- Continue DKA protocol: Insulin drip running at approximately 4 units at this time, LR running at 125 mL/h, potassium replacement protocol    Blood in stool  Assessment & Plan  Inconsistent subjective history.  Possibly caused by DKA versus other GI pathology   -Occult stool added to labs.    Monocytosis  Assessment & Plan  Significant leukocytosis consisting mostly of monocytes and basophils.  Percent of neutrophils is normal.  Urinalysis showing trace leukocyte Estrace but no overt bacteremia, 5-10 white blood cells, ketones and glucose consistent with DKA diagnosis; no overt infection.  Chest x-ray showing no acute abnormalities.  Differential: Diabetic ketoacidosis, unknown infectious etiology   -Negative infectious workup thus far.  Holding antibiotic therapy.   -Continue to trend   -- Blood cultures pending    Acidosis due to type 2 diabetes mellitus (HCC)  Assessment &  Plan  See DKA problem    Transaminitis  Assessment & Plan  Liver enzymes approximately 100-200.  Likely secondary to DKA causing significant nausea, vomiting, diarrhea.   -N.p.o. with ice chips   -- Hepatitis panel pending  --Continue to trend        VTE prophylaxis: enoxaparin ppx    [facility] Gold Team Attending Note  (see full Resident note in EPIC)    I have seen and examined the patient today.  I have reviewed the medical record, laboratory data, imaging, and all relevant studies.  I have discussed the assessment and plan of care with the Internal Medicine Resident and agree with their note and plan as documented.  Please see my separate note for additional details.

## 2024-03-14 NOTE — ED NOTES
Lab called with critical result of CO2 of 3 at 1234. Critical lab result read back to lab.   Dr. Wilhelm notified of critical lab result at 1235.

## 2024-03-14 NOTE — PROGRESS NOTES
"[facility] Gold Team Attending Note  (see full Resident note in EPIC)    I have seen and examined the patient today.  I have reviewed the medical record, laboratory data, imaging, and all relevant studies.  I have discussed the assessment and plan of care with the Internal Medicine Resident and agree with their note and plan as documented.      In brief, patient is a 47 yo F with a PMHx of NIDDM who presents with hyperglycemia.  Patient is transitioning between [place] and [place] and her physician 5 days ago transitioned her back from metformin to 15 units of long-acting insulin without a sliding scale or other medications.  Since then, she has had progressively worsening polyuria, polydipsia, polyphagia, nausea and abdominal pain.  She was evaluated in the outpatient setting today and was found to be hyperglycemic and was transferred to [facility] via ambulance while administered 500 cc of crystalloid.  In the emergency department, patient was found to be in recurrent DKA and was given IV fluids and insulin.  She had an elevated WBC but no obvious signs of infection so antibiotics have been withheld and cultures have been drawn as well as a procalcitonin.  Patient reports being in DKA 3 weeks ago for which she was admitted to the hospital down in [place] again due to transitioning between oral and subcutaneous medications for her diabetes.  She denies any recent fever, chills, cough, URI symptoms, diarrhea or rash.  She had COVID 3 weeks ago as well.    Pertinent exam findings: /66   Pulse 83   Temp 36.4 °C (97.6 °F) (Temporal)   Resp (!) 27   Ht 1.651 m (5' 5\")   Wt 86.6 kg (191 lb)   SpO2 100%   BMI 31.78 kg/m²   Patient is ill-appearing with mild Kussmaul pattern of breathing.  Dry oral mucosal membranes, pale, tachycardic with normal blood pressure.  Lungs are clear to auscultation bilaterally, abdomen is mildly tender throughout without peritoneal signs and normal bowel sounds.  No lower extremity edema.  " Alert and oriented x 4, cranial nerves II through XII are intact    Pertinent labs and imaging results: CBC with differential is remarkable for a white count of 31,000, hemoglobin 15, platelets of 367 with elevated MCV and MCHC.  CMP remarkable for a sodium of 132, potassium 5.3, chloride 95, bicarb of 3, glucose 485 with normal kidney function and an anion gap elevated at 34, calcium low at 7.4, , , alk phos 223, normal bilirubin, phosphorus elevated at 8, magnesium 2.6, beta hydroxybutyric acid greater than 8, urinalysis with greater than 1000 glucose, 160 ketones, negative nitrite, trace leukocyte esterase with 5-10 WBCs and negative bacteria.  Procalcitonin 0.06.    Chest x-ray personally reviewed showing no acute cardiopulmonary process    Assessment/Plan:  Diabetic ketoacidosis with type 2 diabetes  Dehydration  Leukocytosis, reactive without signs of infection  Transaminitis  Hypocalcemia    Patient is critically ill and will be admitted to intensive care unit for DKA management.  She will be started on the DKA protocol with insulin infusion as well as ongoing aggressive fluid resuscitation.  We will closely monitor her glucose, anion gap, bicarb and electrolytes.  She will be kept n.p.o. other than sips with medications and ice chips.  She will eventually be started on a diabetic diet and undergo diabetes education.  A pregnancy test has been ordered.  Infectious workup has been negative thus far and I will hold off on antibiotics.  She will undergo abdominal ultrasound given her transaminitis although with her liver enzyme elevation and her elevated MCV/MCHC I am concerned about alcohol abuse although patient denies.  She will be kept on DVT prophylaxis per ICU protocol.    I discussed patient condition and risk of morbidity and/or mortality with RN, RT, pharmacy, [facility] Gold resident, multidisciplinary team, and emergency physician and patient.     The patient remains critically ill with a  guarded prognosis. High risk of deterioration and worsening vital organ dysfunction and death without the above critical care interventions.    Critical care time = 35 minutes in directly providing and coordinating critical care and extensive data review. No time overlap and excludes procedures.   [id number]    Please note that this dictation was created using voice recognition software. I have made every reasonable attempt to correct obvious errors, but there may be errors of grammar and possibly content that I did not discover before finalizing the note.

## 2024-03-14 NOTE — ED NOTES
ICU aware pt ready for transfer. States they will come for pt once more orders are in for admission.

## 2024-03-14 NOTE — ASSESSMENT & PLAN NOTE
Likely caused by infection and medication changes  Transitioned to SSI and glargine on 3/16    Goal blood glucose 140-180  basal insulin, sliding scale insulin, accuchecks  hypoglycemia protocol  A1c 11.1

## 2024-03-14 NOTE — PROGRESS NOTES
4 Eyes Skin Assessment Completed by LISE Parsons and LISE Thakkar.    Head WDL  Ears WDL  Nose WDL  Mouth WDL  Neck WDL  Breast/Chest WDL  Shoulder Blades WDL  Spine lower back rash  (R) Arm/Elbow/Hand WDL  (L) Arm/Elbow/Hand WDL  Abdomen Redness and Blanching in panis moisture fissure  Groin Redness, Excoriation, and Rash moisture fissure  Scrotum/Coccyx/Buttocks WDL  (R) Leg WDL  (L) Leg WDL  (R) Heel/Foot/Toe WDL  (L) Heel/Foot/Toe WDL          Devices In Places ECG, Blood Pressure Cuff, and Pulse Ox      Interventions In Place Pillows and Q2 Turns    Possible Skin Injury No    Pictures Uploaded Into Epic N/A  Wound Consult Placed N/A  RN Wound Prevention Protocol Ordered Yes nystatin ordered and dry flow in place

## 2024-03-14 NOTE — ASSESSMENT & PLAN NOTE
Mildly elevated likely due to underlying conditions  Monitor synthetic function  Trend  Avoid hepatotoxins

## 2024-03-14 NOTE — ASSESSMENT & PLAN NOTE
Significant leukocytosis consisting mostly of monocytes and basophils.  Percent of neutrophils is normal.  Urinalysis showing trace leukocyte Estrace but no overt bacteremia, 5-10 white blood cells, ketones and glucose consistent with DKA diagnosis; no overt infection.  Chest x-ray showing no acute abnormalities.  Differential: Diabetic ketoacidosis, unknown infectious etiology   -Negative infectious workup thus far.  Holding antibiotic therapy.   -Continue to trend   -- Blood cultures pending

## 2024-03-14 NOTE — ASSESSMENT & PLAN NOTE
Inconsistent subjective history.  Possibly caused by DKA versus other GI pathology   -Occult stool added to labs.

## 2024-03-14 NOTE — ED NOTES
Med rec completed per patient at bedside and [facility] on [facility] in [place], [place] ([phone]).    Allergies reviewed with patient.    Outpatient antibiotics within the last 30 days: NONE; per [facility], last antibiotic dispensed to patient was a 7 day course of amoxicillin-clavulanate on 2/2/2024.    ANTICOAGULATION: NONE.

## 2024-03-14 NOTE — PROGRESS NOTES
Pt transferred to floor and placed on monitor  Pt A&Ox4 Pt complaining of head, abdomen and chest pain that started 3 weeks ago  Pt 99% on RA with labored breathing reporting that she cannot breath, Dr Gonda notified  Bed in low and locked position, call button within reach and fall precautions are in place

## 2024-03-14 NOTE — PROGRESS NOTES
Subjective:     Tamera Jha is a 48 y.o. female who presents for Diabetes (New meds x 1.5 week from metformin to Insulin. Hard time breathing, Slurred speech and vomiting High Hr. Blood in stool. Dizzy. ) and Emesis (2 days )      Diabetes  Hypoglycemia symptoms include dizziness and headaches.   Emesis  Associated symptoms include abdominal pain, chills, a fever, headaches, myalgias, nausea and vomiting.     Pt presents for evaluation of a new problem.  Tamera is a very ill-appearing 48-year-old female who presents to urgent care today with complaints of shortness of breath, severe 10 out of 10 pain in her abdomen, difficulty articulating speech, nausea and vomiting and feeling dizzy.  She was recently hospitalized for diabetic ketoacidosis in [place] from where she is traveling from.  Her significant other states that she was switched from metformin to insulin at this time.  She has used 2 days of insulin and developed a reaction and has since stopped using it.  She states that she has had diabetic ketoacidosis twice in her past and notes that this feels very similar.  Yesterday she and her  donated plasma when her symptoms progressively worsened.  She has been urinating frequently and is very thirsty.    Review of Systems   Constitutional:  Positive for chills, fever and malaise/fatigue.   Gastrointestinal:  Positive for abdominal pain, blood in stool, diarrhea, nausea and vomiting.   Musculoskeletal:  Positive for myalgias.   Neurological:  Positive for dizziness and headaches.       PMH:   Past Medical History:   Diagnosis Date    Diabetes (HCC)     GDM, class B1 3/31/2016    Hypertension     preeclampsia     Polyhydramnios in third trimester, antepartum complication 3/31/2016     ALLERGIES:   Allergies   Allergen Reactions    Levaquin      SURGHX:   Past Surgical History:   Procedure Laterality Date    APPENDECTOMY      CHOLECYSTECTOMY      RHINOPLASTY      TONSILLECTOMY       SOCHX:   Social  "History     Socioeconomic History    Marital status: Single   Tobacco Use    Smoking status: Never    Smokeless tobacco: Never   Substance and Sexual Activity    Alcohol use: No    Drug use: No    Sexual activity: Yes     Partners: Male     Comment: none      FH:   Family History   Problem Relation Age of Onset    Hypertension Mother     Diabetes Mother     Other Father     Heart Disease Father          Objective:   /60   Pulse 84   Temp (!) 35.3 °C (95.6 °F) (Temporal)   Resp (!) 26   Ht 1.651 m (5' 5\")   Wt 86.2 kg (190 lb)   SpO2 100%   BMI 31.62 kg/m²     Physical Exam  Vitals and nursing note reviewed.   Constitutional:       General: She is in acute distress.      Appearance: Normal appearance. She is normal weight. She is ill-appearing and toxic-appearing.   HENT:      Head: Normocephalic.      Right Ear: Tympanic membrane, ear canal and external ear normal.      Left Ear: Tympanic membrane, ear canal and external ear normal.      Nose: No congestion or rhinorrhea.      Mouth/Throat:      Pharynx: No oropharyngeal exudate or posterior oropharyngeal erythema.   Eyes:      General:         Right eye: No discharge.         Left eye: No discharge.      Pupils: Pupils are equal, round, and reactive to light.   Cardiovascular:      Rate and Rhythm: Regular rhythm.      Pulses: Normal pulses.      Heart sounds: Normal heart sounds.   Pulmonary:      Effort: Tachypnea and respiratory distress present.      Breath sounds: No stridor. No wheezing, rhonchi or rales.   Chest:      Chest wall: No tenderness.   Abdominal:      General: Abdomen is flat.      Tenderness: There is abdominal tenderness.   Musculoskeletal:         General: Normal range of motion.      Cervical back: Normal range of motion and neck supple.   Skin:     General: Skin is dry.   Neurological:      General: No focal deficit present.      Mental Status: She is alert and oriented to person, place, and time. Mental status is at baseline. "   Psychiatric:         Mood and Affect: Mood normal.         Behavior: Behavior normal.         Thought Content: Thought content normal.         Judgment: Judgment normal.       Poc glucose: 472  Assessment/Plan:   Assessment      1. Diabetic ketoacidosis without coma associated with type 2 diabetes mellitus (HCC)          Concern at this time is for diabetic ketoacidosis.  Due to patient's toxic appearance she was transferred to emergency room by EMS for further evaluation and higher level of care..  Report given to [facility].

## 2024-03-15 ENCOUNTER — APPOINTMENT (OUTPATIENT)
Dept: RADIOLOGY | Facility: MEDICAL CENTER | Age: 49
DRG: 871 | End: 2024-03-15
Attending: INTERNAL MEDICINE
Payer: COMMERCIAL

## 2024-03-15 PROBLEM — A41.9 SEPSIS (HCC): Status: ACTIVE | Noted: 2024-03-14

## 2024-03-15 PROBLEM — B95.1 BACTEREMIA DUE TO GROUP B STREPTOCOCCUS: Status: ACTIVE | Noted: 2024-03-15

## 2024-03-15 PROBLEM — D72.829 LEUKOCYTOSIS: Status: ACTIVE | Noted: 2024-03-14

## 2024-03-15 PROBLEM — N12 PYELONEPHRITIS: Status: ACTIVE | Noted: 2024-03-15

## 2024-03-15 PROBLEM — R78.81 BACTEREMIA DUE TO GROUP B STREPTOCOCCUS: Status: ACTIVE | Noted: 2024-03-15

## 2024-03-15 LAB
ALBUMIN SERPL BCP-MCNC: 3.9 G/DL (ref 3.2–4.9)
ALBUMIN/GLOB SERPL: 1.4 G/DL
ALP SERPL-CCNC: 189 U/L (ref 30–99)
ALT SERPL-CCNC: 99 U/L (ref 2–50)
ANION GAP SERPL CALC-SCNC: 11 MMOL/L (ref 7–16)
ANION GAP SERPL CALC-SCNC: 11 MMOL/L (ref 7–16)
ANION GAP SERPL CALC-SCNC: 13 MMOL/L (ref 7–16)
ANION GAP SERPL CALC-SCNC: 14 MMOL/L (ref 7–16)
ANION GAP SERPL CALC-SCNC: 20 MMOL/L (ref 7–16)
ANION GAP SERPL CALC-SCNC: 24 MMOL/L (ref 7–16)
ANION GAP SERPL CALC-SCNC: 27 MMOL/L (ref 7–16)
AST SERPL-CCNC: 144 U/L (ref 12–45)
BASE EXCESS BLDV CALC-SCNC: -21 MMOL/L
BILIRUB SERPL-MCNC: 0.2 MG/DL (ref 0.1–1.5)
BODY TEMPERATURE: 36.9 CENTIGRADE
BUN SERPL-MCNC: 11 MG/DL (ref 8–22)
BUN SERPL-MCNC: 13 MG/DL (ref 8–22)
BUN SERPL-MCNC: 16 MG/DL (ref 8–22)
BUN SERPL-MCNC: 18 MG/DL (ref 8–22)
BUN SERPL-MCNC: 20 MG/DL (ref 8–22)
BUN SERPL-MCNC: 21 MG/DL (ref 8–22)
BUN SERPL-MCNC: 9 MG/DL (ref 8–22)
CALCIUM ALBUM COR SERPL-MCNC: 8 MG/DL (ref 8.5–10.5)
CALCIUM SERPL-MCNC: 7.4 MG/DL (ref 8.5–10.5)
CALCIUM SERPL-MCNC: 7.4 MG/DL (ref 8.5–10.5)
CALCIUM SERPL-MCNC: 7.5 MG/DL (ref 8.5–10.5)
CALCIUM SERPL-MCNC: 7.6 MG/DL (ref 8.5–10.5)
CALCIUM SERPL-MCNC: 7.8 MG/DL (ref 8.5–10.5)
CALCIUM SERPL-MCNC: 7.9 MG/DL (ref 8.5–10.5)
CALCIUM SERPL-MCNC: 8.2 MG/DL (ref 8.5–10.5)
CHLORIDE SERPL-SCNC: 106 MMOL/L (ref 96–112)
CHLORIDE SERPL-SCNC: 107 MMOL/L (ref 96–112)
CHLORIDE SERPL-SCNC: 108 MMOL/L (ref 96–112)
CHLORIDE SERPL-SCNC: 108 MMOL/L (ref 96–112)
CHLORIDE SERPL-SCNC: 110 MMOL/L (ref 96–112)
CHLORIDE SERPL-SCNC: 110 MMOL/L (ref 96–112)
CHLORIDE SERPL-SCNC: 111 MMOL/L (ref 96–112)
CO2 SERPL-SCNC: 12 MMOL/L (ref 20–33)
CO2 SERPL-SCNC: 14 MMOL/L (ref 20–33)
CO2 SERPL-SCNC: 14 MMOL/L (ref 20–33)
CO2 SERPL-SCNC: 15 MMOL/L (ref 20–33)
CO2 SERPL-SCNC: 4 MMOL/L (ref 20–33)
CO2 SERPL-SCNC: 6 MMOL/L (ref 20–33)
CO2 SERPL-SCNC: 8 MMOL/L (ref 20–33)
CREAT SERPL-MCNC: 0.61 MG/DL (ref 0.5–1.4)
CREAT SERPL-MCNC: 0.69 MG/DL (ref 0.5–1.4)
CREAT SERPL-MCNC: 0.72 MG/DL (ref 0.5–1.4)
CREAT SERPL-MCNC: 0.79 MG/DL (ref 0.5–1.4)
CREAT SERPL-MCNC: 0.84 MG/DL (ref 0.5–1.4)
CREAT SERPL-MCNC: 0.91 MG/DL (ref 0.5–1.4)
CREAT SERPL-MCNC: 1 MG/DL (ref 0.5–1.4)
EKG IMPRESSION: NORMAL
ERYTHROCYTE [DISTWIDTH] IN BLOOD BY AUTOMATED COUNT: 51.7 FL (ref 35.9–50)
GFR SERPLBLD CREATININE-BSD FMLA CKD-EPI: 103 ML/MIN/1.73 M 2
GFR SERPLBLD CREATININE-BSD FMLA CKD-EPI: 107 ML/MIN/1.73 M 2
GFR SERPLBLD CREATININE-BSD FMLA CKD-EPI: 111 ML/MIN/1.73 M 2
GFR SERPLBLD CREATININE-BSD FMLA CKD-EPI: 70 ML/MIN/1.73 M 2
GFR SERPLBLD CREATININE-BSD FMLA CKD-EPI: 78 ML/MIN/1.73 M 2
GFR SERPLBLD CREATININE-BSD FMLA CKD-EPI: 86 ML/MIN/1.73 M 2
GFR SERPLBLD CREATININE-BSD FMLA CKD-EPI: 93 ML/MIN/1.73 M 2
GLOBULIN SER CALC-MCNC: 2.7 G/DL (ref 1.9–3.5)
GLUCOSE BLD STRIP.AUTO-MCNC: 132 MG/DL (ref 65–99)
GLUCOSE BLD STRIP.AUTO-MCNC: 140 MG/DL (ref 65–99)
GLUCOSE BLD STRIP.AUTO-MCNC: 151 MG/DL (ref 65–99)
GLUCOSE BLD STRIP.AUTO-MCNC: 152 MG/DL (ref 65–99)
GLUCOSE BLD STRIP.AUTO-MCNC: 153 MG/DL (ref 65–99)
GLUCOSE BLD STRIP.AUTO-MCNC: 158 MG/DL (ref 65–99)
GLUCOSE BLD STRIP.AUTO-MCNC: 159 MG/DL (ref 65–99)
GLUCOSE BLD STRIP.AUTO-MCNC: 160 MG/DL (ref 65–99)
GLUCOSE BLD STRIP.AUTO-MCNC: 160 MG/DL (ref 65–99)
GLUCOSE BLD STRIP.AUTO-MCNC: 165 MG/DL (ref 65–99)
GLUCOSE BLD STRIP.AUTO-MCNC: 167 MG/DL (ref 65–99)
GLUCOSE BLD STRIP.AUTO-MCNC: 169 MG/DL (ref 65–99)
GLUCOSE BLD STRIP.AUTO-MCNC: 169 MG/DL (ref 65–99)
GLUCOSE BLD STRIP.AUTO-MCNC: 172 MG/DL (ref 65–99)
GLUCOSE BLD STRIP.AUTO-MCNC: 173 MG/DL (ref 65–99)
GLUCOSE BLD STRIP.AUTO-MCNC: 181 MG/DL (ref 65–99)
GLUCOSE BLD STRIP.AUTO-MCNC: 182 MG/DL (ref 65–99)
GLUCOSE BLD STRIP.AUTO-MCNC: 194 MG/DL (ref 65–99)
GLUCOSE BLD STRIP.AUTO-MCNC: 195 MG/DL (ref 65–99)
GLUCOSE BLD STRIP.AUTO-MCNC: 195 MG/DL (ref 65–99)
GLUCOSE BLD STRIP.AUTO-MCNC: 199 MG/DL (ref 65–99)
GLUCOSE BLD STRIP.AUTO-MCNC: 209 MG/DL (ref 65–99)
GLUCOSE BLD STRIP.AUTO-MCNC: 214 MG/DL (ref 65–99)
GLUCOSE BLD STRIP.AUTO-MCNC: 219 MG/DL (ref 65–99)
GLUCOSE SERPL-MCNC: 136 MG/DL (ref 65–99)
GLUCOSE SERPL-MCNC: 164 MG/DL (ref 65–99)
GLUCOSE SERPL-MCNC: 171 MG/DL (ref 65–99)
GLUCOSE SERPL-MCNC: 171 MG/DL (ref 65–99)
GLUCOSE SERPL-MCNC: 204 MG/DL (ref 65–99)
GLUCOSE SERPL-MCNC: 228 MG/DL (ref 65–99)
GLUCOSE SERPL-MCNC: 237 MG/DL (ref 65–99)
HCO3 BLDV-SCNC: 7 MMOL/L (ref 24–28)
HCT VFR BLD AUTO: 39.3 % (ref 37–47)
HGB BLD-MCNC: 12.9 G/DL (ref 12–16)
INHALED O2 FLOW RATE: ABNORMAL L/MIN
LACTATE SERPL-SCNC: 1 MMOL/L (ref 0.5–2)
MAGNESIUM SERPL-MCNC: 1.7 MG/DL (ref 1.5–2.5)
MAGNESIUM SERPL-MCNC: 1.8 MG/DL (ref 1.5–2.5)
MAGNESIUM SERPL-MCNC: 1.9 MG/DL (ref 1.5–2.5)
MCH RBC QN AUTO: 30.7 PG (ref 27–33)
MCHC RBC AUTO-ENTMCNC: 32.8 G/DL (ref 32.2–35.5)
MCV RBC AUTO: 93.6 FL (ref 81.4–97.8)
PCO2 BLDV: 23.9 MMHG (ref 41–51)
PCO2 TEMP ADJ BLDV: 23.8 MMHG (ref 41–51)
PH BLDV: 7.1 [PH] (ref 7.31–7.45)
PH TEMP ADJ BLDV: 7.1 [PH] (ref 7.31–7.45)
PHOSPHATE SERPL-MCNC: 1.9 MG/DL (ref 2.5–4.5)
PHOSPHATE SERPL-MCNC: 2.1 MG/DL (ref 2.5–4.5)
PHOSPHATE SERPL-MCNC: 4.1 MG/DL (ref 2.5–4.5)
PLATELET # BLD AUTO: 203 K/UL (ref 164–446)
PMV BLD AUTO: 9.7 FL (ref 9–12.9)
PO2 BLDV: 29.5 MMHG (ref 25–40)
PO2 TEMP ADJ BLDV: 29.3 MMHG (ref 25–40)
POTASSIUM SERPL-SCNC: 3.1 MMOL/L (ref 3.6–5.5)
POTASSIUM SERPL-SCNC: 3.6 MMOL/L (ref 3.6–5.5)
POTASSIUM SERPL-SCNC: 3.7 MMOL/L (ref 3.6–5.5)
POTASSIUM SERPL-SCNC: 4.3 MMOL/L (ref 3.6–5.5)
POTASSIUM SERPL-SCNC: 4.5 MMOL/L (ref 3.6–5.5)
POTASSIUM SERPL-SCNC: 4.7 MMOL/L (ref 3.6–5.5)
POTASSIUM SERPL-SCNC: 4.8 MMOL/L (ref 3.6–5.5)
POTASSIUM SERPL-SCNC: 6.3 MMOL/L (ref 3.6–5.5)
PROT SERPL-MCNC: 6.6 G/DL (ref 6–8.2)
RBC # BLD AUTO: 4.2 M/UL (ref 4.2–5.4)
SAO2 % BLDV: 64.3 %
SODIUM SERPL-SCNC: 133 MMOL/L (ref 135–145)
SODIUM SERPL-SCNC: 134 MMOL/L (ref 135–145)
SODIUM SERPL-SCNC: 136 MMOL/L (ref 135–145)
SODIUM SERPL-SCNC: 137 MMOL/L (ref 135–145)
SODIUM SERPL-SCNC: 137 MMOL/L (ref 135–145)
SODIUM SERPL-SCNC: 138 MMOL/L (ref 135–145)
SODIUM SERPL-SCNC: 138 MMOL/L (ref 135–145)
TROPONIN T SERPL-MCNC: 11 NG/L (ref 6–19)
WBC # BLD AUTO: 25.2 K/UL (ref 4.8–10.8)

## 2024-03-15 PROCEDURE — 700101 HCHG RX REV CODE 250

## 2024-03-15 PROCEDURE — 83605 ASSAY OF LACTIC ACID: CPT

## 2024-03-15 PROCEDURE — 93010 ELECTROCARDIOGRAM REPORT: CPT | Performed by: INTERNAL MEDICINE

## 2024-03-15 PROCEDURE — 700111 HCHG RX REV CODE 636 W/ 250 OVERRIDE (IP): Performed by: INTERNAL MEDICINE

## 2024-03-15 PROCEDURE — 82803 BLOOD GASES ANY COMBINATION: CPT

## 2024-03-15 PROCEDURE — 80053 COMPREHEN METABOLIC PANEL: CPT

## 2024-03-15 PROCEDURE — 700117 HCHG RX CONTRAST REV CODE 255: Performed by: INTERNAL MEDICINE

## 2024-03-15 PROCEDURE — 74177 CT ABD & PELVIS W/CONTRAST: CPT

## 2024-03-15 PROCEDURE — 770022 HCHG ROOM/CARE - ICU (200)

## 2024-03-15 PROCEDURE — A9270 NON-COVERED ITEM OR SERVICE: HCPCS

## 2024-03-15 PROCEDURE — 85027 COMPLETE CBC AUTOMATED: CPT

## 2024-03-15 PROCEDURE — 700101 HCHG RX REV CODE 250: Performed by: INTERNAL MEDICINE

## 2024-03-15 PROCEDURE — 700111 HCHG RX REV CODE 636 W/ 250 OVERRIDE (IP): Mod: JZ

## 2024-03-15 PROCEDURE — 700102 HCHG RX REV CODE 250 W/ 637 OVERRIDE(OP)

## 2024-03-15 PROCEDURE — 700105 HCHG RX REV CODE 258

## 2024-03-15 PROCEDURE — 83735 ASSAY OF MAGNESIUM: CPT

## 2024-03-15 PROCEDURE — 84132 ASSAY OF SERUM POTASSIUM: CPT

## 2024-03-15 PROCEDURE — 84100 ASSAY OF PHOSPHORUS: CPT | Mod: 91

## 2024-03-15 PROCEDURE — 99291 CRITICAL CARE FIRST HOUR: CPT | Mod: GC | Performed by: INTERNAL MEDICINE

## 2024-03-15 PROCEDURE — 84484 ASSAY OF TROPONIN QUANT: CPT

## 2024-03-15 PROCEDURE — 82962 GLUCOSE BLOOD TEST: CPT | Mod: 91

## 2024-03-15 PROCEDURE — 80048 BASIC METABOLIC PNL TOTAL CA: CPT | Mod: 91

## 2024-03-15 PROCEDURE — 700105 HCHG RX REV CODE 258: Performed by: INTERNAL MEDICINE

## 2024-03-15 RX ORDER — OXYCODONE HYDROCHLORIDE 5 MG/1
5 TABLET ORAL EVERY 6 HOURS PRN
Status: DISCONTINUED | OUTPATIENT
Start: 2024-03-15 | End: 2024-03-21 | Stop reason: HOSPADM

## 2024-03-15 RX ORDER — CALCIUM GLUCONATE 20 MG/ML
2 INJECTION, SOLUTION INTRAVENOUS ONCE
Status: COMPLETED | OUTPATIENT
Start: 2024-03-15 | End: 2024-03-15

## 2024-03-15 RX ORDER — OXYCODONE HYDROCHLORIDE 10 MG/1
10 TABLET ORAL EVERY 6 HOURS PRN
Status: DISCONTINUED | OUTPATIENT
Start: 2024-03-15 | End: 2024-03-21 | Stop reason: HOSPADM

## 2024-03-15 RX ORDER — SODIUM CHLORIDE, SODIUM LACTATE, POTASSIUM CHLORIDE, AND CALCIUM CHLORIDE .6; .31; .03; .02 G/100ML; G/100ML; G/100ML; G/100ML
500 INJECTION, SOLUTION INTRAVENOUS
Status: DISCONTINUED | OUTPATIENT
Start: 2024-03-15 | End: 2024-03-21 | Stop reason: HOSPADM

## 2024-03-15 RX ORDER — POTASSIUM CHLORIDE 7.45 MG/ML
10 INJECTION INTRAVENOUS
Status: COMPLETED | OUTPATIENT
Start: 2024-03-15 | End: 2024-03-15

## 2024-03-15 RX ORDER — POTASSIUM CHLORIDE 7.45 MG/ML
10 INJECTION INTRAVENOUS
Status: DISCONTINUED | OUTPATIENT
Start: 2024-03-15 | End: 2024-03-15

## 2024-03-15 RX ORDER — POTASSIUM CHLORIDE 7.45 MG/ML
10 INJECTION INTRAVENOUS
Status: COMPLETED | OUTPATIENT
Start: 2024-03-15 | End: 2024-03-16

## 2024-03-15 RX ADMIN — POTASSIUM PHOSPHATE, MONOBASIC AND POTASSIUM PHOSPHATE, DIBASIC 30 MMOL: 224; 236 INJECTION, SOLUTION, CONCENTRATE INTRAVENOUS at 12:23

## 2024-03-15 RX ADMIN — NYSTATIN: 100000 POWDER TOPICAL at 05:58

## 2024-03-15 RX ADMIN — POTASSIUM CHLORIDE 10 MEQ: 7.46 INJECTION, SOLUTION INTRAVENOUS at 09:28

## 2024-03-15 RX ADMIN — CEFAZOLIN 2 G: 10 INJECTION, POWDER, FOR SOLUTION INTRAVENOUS at 14:36

## 2024-03-15 RX ADMIN — CEFAZOLIN 2 G: 10 INJECTION, POWDER, FOR SOLUTION INTRAVENOUS at 21:52

## 2024-03-15 RX ADMIN — DEXTROSE AND SODIUM CHLORIDE: 10; .45 INJECTION, SOLUTION INTRAVENOUS at 05:55

## 2024-03-15 RX ADMIN — CEFAZOLIN 2 G: 10 INJECTION, POWDER, FOR SOLUTION INTRAVENOUS at 08:21

## 2024-03-15 RX ADMIN — POTASSIUM CHLORIDE 10 MEQ: 7.46 INJECTION, SOLUTION INTRAVENOUS at 19:44

## 2024-03-15 RX ADMIN — DEXTROSE AND SODIUM CHLORIDE: 10; .45 INJECTION, SOLUTION INTRAVENOUS at 21:17

## 2024-03-15 RX ADMIN — POTASSIUM CHLORIDE 10 MEQ: 7.46 INJECTION, SOLUTION INTRAVENOUS at 08:22

## 2024-03-15 RX ADMIN — CALCIUM GLUCONATE 2 G: 20 INJECTION, SOLUTION INTRAVENOUS at 02:29

## 2024-03-15 RX ADMIN — ACETAMINOPHEN 650 MG: 325 TABLET, FILM COATED ORAL at 22:08

## 2024-03-15 RX ADMIN — SODIUM CHLORIDE 8 UNITS/HR: 9 INJECTION, SOLUTION INTRAVENOUS at 19:07

## 2024-03-15 RX ADMIN — NYSTATIN: 100000 POWDER TOPICAL at 17:46

## 2024-03-15 RX ADMIN — IOHEXOL 100 ML: 350 INJECTION, SOLUTION INTRAVENOUS at 12:54

## 2024-03-15 RX ADMIN — DEXTROSE AND SODIUM CHLORIDE: 10; .45 INJECTION, SOLUTION INTRAVENOUS at 12:11

## 2024-03-15 RX ADMIN — MAGNESIUM SULFATE HEPTAHYDRATE 2 G: 2 INJECTION, SOLUTION INTRAVENOUS at 03:32

## 2024-03-15 RX ADMIN — POTASSIUM CHLORIDE 10 MEQ: 7.46 INJECTION, SOLUTION INTRAVENOUS at 23:02

## 2024-03-15 RX ADMIN — ENOXAPARIN SODIUM 40 MG: 100 INJECTION SUBCUTANEOUS at 17:46

## 2024-03-15 RX ADMIN — ACETAMINOPHEN 650 MG: 325 TABLET, FILM COATED ORAL at 06:38

## 2024-03-15 RX ADMIN — SODIUM CHLORIDE 8 UNITS/HR: 9 INJECTION, SOLUTION INTRAVENOUS at 05:51

## 2024-03-15 RX ADMIN — POTASSIUM CHLORIDE 10 MEQ: 7.46 INJECTION, SOLUTION INTRAVENOUS at 18:42

## 2024-03-15 RX ADMIN — ACETAMINOPHEN 650 MG: 325 TABLET, FILM COATED ORAL at 14:34

## 2024-03-15 RX ADMIN — POTASSIUM PHOSPHATE, MONOBASIC AND POTASSIUM PHOSPHATE, DIBASIC 30 MMOL: 224; 236 INJECTION, SOLUTION, CONCENTRATE INTRAVENOUS at 03:42

## 2024-03-15 RX ADMIN — DEXTROSE AND SODIUM CHLORIDE: 5; 450 INJECTION, SOLUTION INTRAVENOUS at 01:23

## 2024-03-15 RX ADMIN — OXYCODONE 5 MG: 5 TABLET ORAL at 18:49

## 2024-03-15 ASSESSMENT — PAIN DESCRIPTION - PAIN TYPE
TYPE: ACUTE PAIN

## 2024-03-15 ASSESSMENT — ENCOUNTER SYMPTOMS
HEADACHES: 0
DIARRHEA: 0
PALPITATIONS: 0
LOSS OF CONSCIOUSNESS: 0
VOMITING: 1
SHORTNESS OF BREATH: 0
SEIZURES: 0
ABDOMINAL PAIN: 1
SPUTUM PRODUCTION: 0
HEARTBURN: 0
INSOMNIA: 0
COUGH: 1
CHILLS: 1
WEIGHT LOSS: 0
NAUSEA: 1
HEMOPTYSIS: 0
FEVER: 0
CONSTIPATION: 0

## 2024-03-15 ASSESSMENT — FIBROSIS 4 INDEX: FIB4 SCORE: 3.35

## 2024-03-15 ASSESSMENT — LIFESTYLE VARIABLES: SUBSTANCE_ABUSE: 1

## 2024-03-15 NOTE — ASSESSMENT & PLAN NOTE
Hemoccult positive    Hemoglobin overall stable  Reviewed with patient that she will need outpatient GI evaluation with upper and lower endoscopies  Monitor hemoglobin

## 2024-03-15 NOTE — HOSPITAL COURSE
"\"47 yo F with a PMHx of NIDDM who presents with hyperglycemia.  Patient is transitioning between [place] and [place] and her physician 5 days ago transitioned her back from metformin to 15 units of long-acting insulin without a sliding scale or other medications.  Since then, she has had progressively worsening polyuria, polydipsia, polyphagia, nausea and abdominal pain.  She was evaluated in the outpatient setting today and was found to be hyperglycemic and was transferred to [facility] via ambulance while administered 500 cc of crystalloid.  In the emergency department, patient was found to be in recurrent DKA and was given IV fluids and insulin.  She had an elevated WBC but no obvious signs of infection so antibiotics have been withheld and cultures have been drawn as well as a procalcitonin.  Patient reports being in DKA 3 weeks ago for which she was admitted to the hospital down in [place] again due to transitioning between oral and subcutaneous medications for her diabetes.  She denies any recent fever, chills, cough, URI symptoms, diarrhea or rash.  She had COVID 3 weeks ago as well.\"  "

## 2024-03-15 NOTE — PROGRESS NOTES
MONITOR SUMMARY  Rate:  Rhythm: Sinus rhythm/sinus tach  Ectopy:none noticed  Measurements:0.18/0.079/0.459

## 2024-03-15 NOTE — ASSESSMENT & PLAN NOTE
Blood cultures + March 14 1 out of 2 for repeat streptococcal bacteremia.  .  No growth to date blood cultures starting March 16  -Continuing Ancef  -TTE unable to visualize any valves.  Considering ILDA later hospital course pending clinical course and cultures.  -MRI cervical and thoracic spine: Negative for osteomyelitis/discitis

## 2024-03-15 NOTE — PROGRESS NOTES
4 Eyes Skin Assessment Completed by LISE Portillo and LISE Toth.    Head WDL  Ears WDL  Nose WDL  Mouth WDL  Neck WDL  Breast/Chest WDL  Shoulder Blades Redness and Blanching  Spine Redness and Blanching Rash on lower back  (R) Arm/Elbow/Hand Redness and Blanching  (L) Arm/Elbow/Hand Redness and Blanching  Abdomen Pannus redness- interdry  Groin Redness, Excoriation, and Rash  Scrotum/Coccyx/Buttocks Redness and Blanching  (R) Leg WDL  (L) Leg WDL  (R) Heel/Foot/Toe Redness and Blanching  (L) Heel/Foot/Toe Redness and Blanching    Devices In Places ECG, Blood Pressure Cuff, Pulse Ox, and SCD's, Soft Co2 nasal cannula      Interventions In Place Sacral Mepilex, Low Air Loss Mattress, and Barrier Cream    Possible Skin Injury Yes- groin    Pictures Uploaded Into Epic No, needs to be completed  Wound Consult Placed N/A  RN Wound Prevention Protocol Ordered Yes interdry, cream, nystatin     Mom aware and will be here by 3:15.

## 2024-03-15 NOTE — CARE PLAN
The patient is Watcher - Medium risk of patient condition declining or worsening         Progress made toward(s) clinical / shift goals:  blood sugar decreasing    Patient is not progressing towards the following goals: continued labored breathing, CO 3 and pain to head, chest and abdomen

## 2024-03-15 NOTE — ASSESSMENT & PLAN NOTE
Bicarb of 3, anion gap in the 30s on admission, beta hydroxybutyrate greater than 8 (unable to detect), point-of-care glucoses approximately 450.      DKA resolved  Given hemoglobin A1c greater than 10 patient will need insulin to get her diabetes under control  Continue Lantus Premeal Humalog and sliding scale insulin  Consider resuming metformin at discharge  Patient will need testing supplies and insulin prescriptions on discharge and close outpatient follow-up  Diabetic teaching

## 2024-03-15 NOTE — ASSESSMENT & PLAN NOTE
Likely urinary source with CT scan now showing right pyelonephritis  Continue Ancef  Repeat cultures pending  Echocardiogram pending  ID on board  MRI cervical and thoracic spine pending

## 2024-03-15 NOTE — ASSESSMENT & PLAN NOTE
Improved  Reviewed CT abdomen from 3/15/2024 revealing fatty liver infiltration    Diabetic control monitor LFTs and outpatient follow-up

## 2024-03-15 NOTE — PROGRESS NOTES
[facility] Progress Note      Admit Date: 3/14/2024    Resident(s): Adán Box D.O.   Attending:  TORY FIELDS/ Dr. Stevenson    Patient ID:    Name:  Tamera Jha   YOB: 1975  Age:  48 y.o.  female   MRN:  [id number]    Hospital Course (carried forward and updated):  Tamera Jha is a 48 y.o. female who presented 3/14/2024 with diabetes mellitus type 2 (last A1c of 7.0 approximately 8 years ago).  She presents in DKA, with sepsis due to group B streptococcal bacteremia    Consultants:  Critical Care       Interval Events:    No acute events overnight,  Mild improvement in labs including bicarbonate, glucose.  Continued nausea, generalized fatigue/malaise, generalized pain.  Blood cultures 1/2 comes back for group B streptococcal bacteremia.  Right upper quadrant ultrasound significant for heterogenously echogenic liver concerning for pathogenic processes.  CT abdomen for further workup showing suspected right-sided pyelonephritis.  3.2 cm likely fibroid.  Findings of liver suggestive of fatty infiltration.  TTE ordered and pending      NEURO: Nonfocal.  CARDIOVASC:Continue tachycardia overall improved.  Likely physiologic context of sepsis.  RESPIRATORY: Remains on room air  GI/NUTRITION: N.p.o. with ice chips.  RENAL/FLUID/LYTES: Continuing fluids per DKA protocols.  HEME/ONC: Continued significant leukocytosis.  INFECTIOUS D: Cefazolin for group B streptococcal bacteremia secondary to pyelonephritis  ENDOCRINE: Diabetic management, DKA protocols.    Vitals Range last 24h:  Temp:  [33.7 °C (92.7 °F)-37.5 °C (99.5 °F)] 37.5 °C (99.5 °F)  Pulse:  [] 98  Resp:  [15-38] 21  BP: (108-167)/(62-84) 137/80  SpO2:  [96 %-100 %] 99 %      Intake/Output Summary (Last 24 hours) at 3/15/2024 1531  Last data filed at 3/15/2024 1400  Gross per 24 hour   Intake 7956.33 ml   Output 3625 ml   Net 4331.33 ml        Review of Systems   Constitutional:  Positive for chills and malaise/fatigue (Mildly  improved). Negative for fever and weight loss.   Respiratory:  Positive for cough. Negative for hemoptysis, sputum production and shortness of breath.    Cardiovascular:  Negative for chest pain, palpitations and leg swelling.   Gastrointestinal:  Positive for abdominal pain, nausea and vomiting. Negative for constipation, diarrhea, heartburn and melena (Described stool is yellowish in appearance.).   Genitourinary:  Positive for dysuria and frequency.   Neurological:  Negative for seizures, loss of consciousness and headaches.   Psychiatric/Behavioral:  Positive for substance abuse. The patient does not have insomnia.        PHYSICAL EXAM:  Vitals:    03/15/24 1300 03/15/24 1400 03/15/24 1434 03/15/24 1500   BP: (!) 143/64 (!) 148/77 135/80 137/80   Pulse: (!) 105 (!) 102 98 98   Resp: (!) 24 (!) 34 (!) 27 (!) 21   Temp:       TempSrc:       SpO2: 97% 99%     Weight:       Height:        Body mass index is 33.09 kg/m².    O2 therapy: Pulse Oximetry: 99 %, O2 (LPM): 0, O2 Delivery Device: None - Room Air    Date 03/15/24 0700 - 03/16/24 0659   Shift 2067-8597 8791-1967 1598-8674 24 Hour Total   INTAKE   I.V. 1277.4   1277.4     Volume (mL) Insulin 63.7   63.7     Volume (mL) (D10%-0.45% NaCl infusion) 1213.7   1213.7     Volume (mL) (D5 1/2 NS infusion) 0   0   IV Piggyback 333   333     Volume (mL) (potassium chloride (Kcl) ivpb 10 mEq) 198.6   198.6     Volume (mL) (potassium phosphate IVPB 30 mmol in 500 mL D5W (premix)) 134.4   134.4   Shift Total 1610.4   1610.4   OUTPUT   Urine 1325   1325     Output (mL) (Urethral Catheter Temperature probe 16 Fr.) 1325   1325   Shift Total 1325   1325   .4   285.4        Physical Exam  Vitals and nursing note reviewed.   Constitutional:       General: She is not in acute distress.     Appearance: She is ill-appearing. She is not diaphoretic.   HENT:      Head: Normocephalic and atraumatic.      Right Ear: External ear normal.      Left Ear: External ear normal.       Nose: Nose normal.   Eyes:      General: No scleral icterus.     Conjunctiva/sclera: Conjunctivae normal.   Cardiovascular:      Rate and Rhythm: Regular rhythm. Tachycardia present.      Pulses: Normal pulses.      Heart sounds: No murmur heard.  Pulmonary:      Effort: Pulmonary effort is normal. No respiratory distress.   Abdominal:      General: Abdomen is flat. There is no distension.      Palpations: There is no mass.      Tenderness: There is abdominal tenderness. There is no guarding or rebound.      Hernia: No hernia is present.   Musculoskeletal:         General: No deformity.      Right lower leg: No edema.      Left lower leg: No edema.   Skin:     General: Skin is warm and dry.      Coloration: Skin is not jaundiced or pale.   Neurological:      General: No focal deficit present.      Mental Status: She is alert and oriented to person, place, and time. Mental status is at baseline.   Psychiatric:         Mood and Affect: Mood normal.         Thought Content: Thought content normal.         Recent Labs     03/15/24  0411   [id number] n/a     Recent Labs     03/14/24  1900 03/14/24  2210 03/15/24  0215 03/15/24  0411 03/15/24  0630 03/15/24  1057   SODIUM 139   < >  --  138 138 137   POTASSIUM 5.0   < > 4.8 4.7 4.3 3.6   CHLORIDE 104   < >  --  108 110 111   CO2 2*   < >  --  6* 8* 12*   BUN 21   < >  --  20 18 16   CREATININE 0.91   < >  --  0.91 0.84 0.79   MAGNESIUM 2.0  --  1.7  --   --  1.9   PHOSPHORUS 4.5  --  2.1*  --   --  1.9*   CALCIUM 7.7*   < >  --  8.2* 7.8* 7.6*    < > = values in this interval not displayed.     Recent Labs     03/14/24  1126 03/14/24  1515 03/14/24  1740 03/15/24  0001 03/15/24  0411 03/15/24  0630 03/15/24  1057   ALTSGPT 101* 98*  --  99*  --   --   --    ASTSGOT 199* 199*  --  144*  --   --   --    ALKPHOSPHAT 223* 201*  --  189*  --   --   --    TBILIRUBIN 0.2 0.2  --  0.2  --   --   --    GLUCOSE 485* 365*   < > 164* 171* 171* 204*    < > = values in this interval not  displayed.     Recent Labs     03/14/24  1126 03/14/24  1515 03/15/24  0411   RBC 4.84 4.56 4.20   HEMOGLOBIN 14.7 13.7 12.9   HEMATOCRIT 49.7* 45.4 39.3   PLATELETCT 367 358 203     Recent Labs     03/14/24  1126 03/14/24  1515 03/15/24  0001 03/15/24  0411   WBC 30.7* 30.3*  --  25.2*   NEUTSPOLYS 71.80 80.70*  --   --    LYMPHOCYTES 11.10* 5.30*  --   --    MONOCYTES 7.70 10.50  --   --    EOSINOPHILS 0.80 0.00  --   --    BASOPHILS 2.60* 0.90  --   --    ASTSGOT 199* 199* 144*  --    ALTSGPT 101* 98* 99*  --    ALKPHOSPHAT 223* 201* 189*  --    TBILIRUBIN 0.2 0.2 0.2  --        Meds:   ceFAZolin  2 g      potassium phosphate  30 mmol 30 mmol (03/15/24 1223)    D10%-0.45% NaCl   100 mL/hr at 03/15/24 1313    MD ALERT-PHARMACY TO CONSULT  1 Each      Adult DKA potassium(K+) replacement scale  1 Each      enoxaparin (LOVENOX) injection  40 mg      acetaminophen  650 mg      senna-docusate  2 Tablet      And    polyethylene glycol/lytes  1 Packet      labetalol  10 mg      insulin regular (Humulin R) 100 Units in  mL Infusion for DKA  4 Units/hr 8 Units/hr (03/15/24 1512)    nystatin            Procedures:  N/a    Imaging:  CT-ABDOMEN-PELVIS WITH   Final Result      1.  Right-sided pyelonephritis is suspected.   2.  3.2 cm uterine mass which statistically most likely represents uterine fibroid.   3.  Fatty liver infiltration.      US-RUQ   Final Result      1.  The liver is heterogeneously echogenic, most compatible with fatty infiltration, however other hepatocellular disease processes are in the differential diagnosis.   2.  Surgically absent gallbladder with minimal probable physiologic prominence of the common bile duct.   3.  Exam limited by bowel gas.      DX-CHEST-PORTABLE (1 VIEW)   Final Result      1.  There is no acute cardiopulmonary process.      EC-ECHOCARDIOGRAM COMPLETE W/O CONT    (Results Pending)       ASSESSEMENT and PLAN:    * DKA, type 2, not at goal (HCC)- (present on  admission)  Assessment & Plan  Bicarb of 3, anion gap in the 30s on admission, beta hydroxybutyrate greater than 8 (unable to detect), point-of-care glucoses approximately 450.  A1c of 11 on admission.  Appears poorly controlled and likely patient will need insulin therapy upon discharge and likely indefinitely.   -Moderate improvement in acidosis to a bicarbonate of 12.  Anion gap just closed to 14.   -- Continue DKA protocol: Insulin drip running at approximately 8 units units at this time, potassium replacement protocol.  D10 one half NS, potassium replacements running with dextrose as well.    Bacteremia due to group B Streptococcus  Assessment & Plan  Blood cultures + March 14 1 out of 2 for repeat streptococcal bacteremia.  Susceptibilities in progress.  --Ancef added March 15.  -TTE ordered and pending.      Pyelonephritis  Assessment & Plan  Suspected pyelonephritis present on CT scan on March 15.  Likely source of group B streptococcal bacteremia   -Continuing cefazolin.   -Blood culture showing group A streptococcal bacteremia.  1 out of 2 completed March 14.    Blood in stool  Assessment & Plan  Inconsistent subjective history.  Possibly caused by DKA versus other GI pathology   -Occult stool added to labs.    Leukocytosis  Assessment & Plan  Significant leukocytosis consisting mostly of neutrophils, monocytes and basophils.  Urinalysis showing trace leukocyte Estrace but no overt bacteremia, 5-10 white blood cells, ketones and glucose consistent with DKA diagnosis.  However CT scan shows  likely right-sided pyelonephritis  Chest x-ray showing no acute abnormalities.  Differential: Group B streptococcal bacteremia, pyelonephritis, diabetic ketoacidosis   -See group G streptococcal bacteremia, pyelonephritis problems   -- Repeating blood cultures pending March 15 as well.    Transaminitis  Assessment & Plan  Liver enzymes approximately 100-200. Likely secondary to hepatic steatosis, DKA causing significant  nausea, vomiting, diarrhea.   -N.p.o. with ice chips   -- Hepatitis panel nonreactive  --Continue to trend.  Appears mildly improving.  -CT scan March 15 showing fatty infiltration of the liver.  No HCC concerns.        DISPO: Continue ICU stay    CODE STATUS: Full code    Quality Measures:  Feeding: N.p.o. at this time  Analgesia: N/A  Sedation: N/A  Thromboprophylaxis: Lovenox  Head of bed: >30 degrees  Ulcer prophylaxis: N/A  Glycemic control: Correctional: DKA protocol / Basal: DKA protocol  Bowel care: bowel regimen senna docusate as needed  Indwelling lines: Peripheral line x 4, urethral catheter,  Deescalation of antibiotics: Adding cefazolin at this time      Adán Box D.O.

## 2024-03-15 NOTE — CARE PLAN
The patient is Stable - Low risk of patient condition declining or worsening    Shift Goals  Clinical Goals: Transtion off insulin gtt by AM  Patient Goals: Sleep with less pain    Progress made toward(s) clinical / shift goals:  CO2 improving, gap improving, tolerating pain meds, ambulated to commode, SCDs on.   Problem: Pain - Standard  Goal: Alleviation of pain or a reduction in pain to the patient’s comfort goal  Outcome: Progressing     Problem: Hemodynamics  Goal: Patient's hemodynamics, fluid balance and neurologic status will be stable or improve  Outcome: Progressing     Problem: Respiratory  Goal: Patient will achieve/maintain optimum respiratory ventilation and gas exchange  Outcome: Progressing     Problem: Venous Thromboembolism (VTE) Prevention  Goal: The patient will remain free from venous thromboembolism (VTE)  Outcome: Progressing     Problem: Urinary Elimination  Goal: Establish and maintain regular urinary output  Outcome: Progressing       Patient is not progressing towards the following goals:

## 2024-03-15 NOTE — ASSESSMENT & PLAN NOTE
Suspected pyelonephritis present on CT scan on March 15.  Likely source of group B streptococcal bacteremia   -Continuing cefazolin.   -ID following  No valvular abnormalities on surface echo but poor visualization's of valves  Follow-up on repeat blood cultures from 3/16/2024

## 2024-03-15 NOTE — CARE PLAN
Problem: Pain - Standard  Goal: Alleviation of pain or a reduction in pain to the patient’s comfort goal  Outcome: Progressing     Problem: Knowledge Deficit - Standard  Goal: Patient and family/care givers will demonstrate understanding of plan of care, disease process/condition, diagnostic tests and medications  Outcome: Progressing   The patient is Watcher - Medium risk of patient condition declining or worsening    Shift Goals  Clinical Goals: blood sugar management  Patient Goals: rest, less pain  Family Goals: MONICA    Progress made toward(s) clinical / shift goals:  medicated per mar for pain, updated on poc, pt to ct this shift, continuing through DKA protocol     Patient is not progressing towards the following goals:

## 2024-03-15 NOTE — WOUND TEAM
Wound Team consulted for MASD to BL inner thighs. Appropriate skin care orders already in place. Nursing to continue application of nystatin powder for antifungal therapy and interdry cloth to wick moisture. No advanced wound care needs identified. Consult complete.

## 2024-03-15 NOTE — ASSESSMENT & PLAN NOTE
This is Sepsis Present on admission  SIRS criteria identified on my evaluation include: Tachycardia, with heart rate greater than 90 BPM, Tachypnea, with respirations greater than 20 per minute, and Leukocytosis, with WBC greater than 12,000  Clinical indicators of end organ dysfunction include Lactic Acid greater than 2  Source is pyelonephritis, bacteremia  Sepsis protocol initiated  Crystalloid Fluid Administration: Fluid resuscitation ordered per standard protocol - 30 mL/kg per current or ideal body weight  IV antibiotics as appropriate for source of sepsis  Reassessment: I have reassessed the patient's hemodynamic status

## 2024-03-15 NOTE — ASSESSMENT & PLAN NOTE
Source pyelonephritis  Blood cultures group B strep  ID following continue IV cefazolin and follow-up on repeat blood cultures from 3/16/2024

## 2024-03-15 NOTE — PROGRESS NOTES
4 Eyes Skin Assessment Completed by LISE Jacome and LISE Bansal.    Head WDL  Ears WDL  Nose WDL  Mouth WDL  Neck WDL  Breast/Chest WDL  Shoulder Blades Redness and Blanching  Spine Redness and Blanching Rash lower back   (R) Arm/Elbow/Hand Redness and Blanching  (L) Arm/Elbow/Hand Redness and Blanching  Abdomen Redness-interdry   Groin Redness, Excoriation, and Rash- wound following   Scrotum/Coccyx/Buttocks Redness, Blanching, and Scab and rash   (R) Leg WDL  (L) Leg WDL  (R) Heel/Foot/Toe Redness and Blanching  (L) Heel/Foot/Toe Redness and Blanching          Devices In Places ECG, Tele Box, Blood Pressure Cuff, Pulse Ox, Zheng, and SCD's      Interventions In Place Gray Ear Foams, InterDry, Sacral Mepilex, Waffle Overlay, Pillows, Elbow Mepilex, Q2 Turns, Dri-Brandt Pads, Heels Loaded W/Pillows, and Pressure Redistribution Mattress    Possible Skin Injury Yes    Pictures Uploaded Into Epic Yes  Wound Consult Placed yes   RN Wound Prevention Protocol Ordered Yes    Blister on back of R thigh found and noted, bleeding

## 2024-03-16 ENCOUNTER — APPOINTMENT (OUTPATIENT)
Dept: RADIOLOGY | Facility: MEDICAL CENTER | Age: 49
DRG: 871 | End: 2024-03-16
Attending: INTERNAL MEDICINE
Payer: COMMERCIAL

## 2024-03-16 ENCOUNTER — APPOINTMENT (OUTPATIENT)
Dept: CARDIOLOGY | Facility: MEDICAL CENTER | Age: 49
DRG: 871 | End: 2024-03-16
Attending: INTERNAL MEDICINE
Payer: COMMERCIAL

## 2024-03-16 LAB
AMPHET UR QL SCN: NEGATIVE
ANION GAP SERPL CALC-SCNC: 10 MMOL/L (ref 7–16)
ANION GAP SERPL CALC-SCNC: 9 MMOL/L (ref 7–16)
BARBITURATES UR QL SCN: NEGATIVE
BENZODIAZ UR QL SCN: NEGATIVE
BUN SERPL-MCNC: 5 MG/DL (ref 8–22)
BUN SERPL-MCNC: 7 MG/DL (ref 8–22)
BZE UR QL SCN: NEGATIVE
CALCIUM SERPL-MCNC: 7.6 MG/DL (ref 8.5–10.5)
CALCIUM SERPL-MCNC: 7.6 MG/DL (ref 8.5–10.5)
CANNABINOIDS UR QL SCN: POSITIVE
CHLORIDE SERPL-SCNC: 112 MMOL/L (ref 96–112)
CHLORIDE SERPL-SCNC: 112 MMOL/L (ref 96–112)
CO2 SERPL-SCNC: 17 MMOL/L (ref 20–33)
CO2 SERPL-SCNC: 18 MMOL/L (ref 20–33)
CREAT SERPL-MCNC: 0.51 MG/DL (ref 0.5–1.4)
CREAT SERPL-MCNC: 0.61 MG/DL (ref 0.5–1.4)
FENTANYL UR QL: NEGATIVE
GFR SERPLBLD CREATININE-BSD FMLA CKD-EPI: 111 ML/MIN/1.73 M 2
GFR SERPLBLD CREATININE-BSD FMLA CKD-EPI: 116 ML/MIN/1.73 M 2
GLUCOSE BLD STRIP.AUTO-MCNC: 100 MG/DL (ref 65–99)
GLUCOSE BLD STRIP.AUTO-MCNC: 103 MG/DL (ref 65–99)
GLUCOSE BLD STRIP.AUTO-MCNC: 104 MG/DL (ref 65–99)
GLUCOSE BLD STRIP.AUTO-MCNC: 106 MG/DL (ref 65–99)
GLUCOSE BLD STRIP.AUTO-MCNC: 109 MG/DL (ref 65–99)
GLUCOSE BLD STRIP.AUTO-MCNC: 124 MG/DL (ref 65–99)
GLUCOSE BLD STRIP.AUTO-MCNC: 125 MG/DL (ref 65–99)
GLUCOSE BLD STRIP.AUTO-MCNC: 137 MG/DL (ref 65–99)
GLUCOSE BLD STRIP.AUTO-MCNC: 181 MG/DL (ref 65–99)
GLUCOSE BLD STRIP.AUTO-MCNC: 236 MG/DL (ref 65–99)
GLUCOSE BLD STRIP.AUTO-MCNC: 77 MG/DL (ref 65–99)
GLUCOSE BLD STRIP.AUTO-MCNC: 81 MG/DL (ref 65–99)
GLUCOSE BLD STRIP.AUTO-MCNC: 82 MG/DL (ref 65–99)
GLUCOSE BLD STRIP.AUTO-MCNC: 99 MG/DL (ref 65–99)
GLUCOSE SERPL-MCNC: 85 MG/DL (ref 65–99)
GLUCOSE SERPL-MCNC: 91 MG/DL (ref 65–99)
HEMOCCULT STL QL: POSITIVE
LV EJECT FRACT  99904: 60
LV EJECT FRACT MOD 2C 99903: 59.29
LV EJECT FRACT MOD 4C 99902: 59.31
LV EJECT FRACT MOD BP 99901: 60.22
MAGNESIUM SERPL-MCNC: 1.8 MG/DL (ref 1.5–2.5)
MAGNESIUM SERPL-MCNC: 2.4 MG/DL (ref 1.5–2.5)
METHADONE UR QL SCN: NEGATIVE
OPIATES UR QL SCN: NEGATIVE
OXYCODONE UR QL SCN: POSITIVE
PCP UR QL SCN: NEGATIVE
PHOSPHATE SERPL-MCNC: 1.5 MG/DL (ref 2.5–4.5)
PHOSPHATE SERPL-MCNC: 2 MG/DL (ref 2.5–4.5)
POTASSIUM SERPL-SCNC: 3.3 MMOL/L (ref 3.6–5.5)
POTASSIUM SERPL-SCNC: 3.3 MMOL/L (ref 3.6–5.5)
PROPOXYPH UR QL SCN: NEGATIVE
SODIUM SERPL-SCNC: 139 MMOL/L (ref 135–145)
SODIUM SERPL-SCNC: 139 MMOL/L (ref 135–145)

## 2024-03-16 PROCEDURE — A9270 NON-COVERED ITEM OR SERVICE: HCPCS | Performed by: INTERNAL MEDICINE

## 2024-03-16 PROCEDURE — 99255 IP/OBS CONSLTJ NEW/EST HI 80: CPT | Performed by: INTERNAL MEDICINE

## 2024-03-16 PROCEDURE — 770022 HCHG ROOM/CARE - ICU (200)

## 2024-03-16 PROCEDURE — A9270 NON-COVERED ITEM OR SERVICE: HCPCS

## 2024-03-16 PROCEDURE — 80307 DRUG TEST PRSMV CHEM ANLYZR: CPT

## 2024-03-16 PROCEDURE — 700102 HCHG RX REV CODE 250 W/ 637 OVERRIDE(OP)

## 2024-03-16 PROCEDURE — 700102 HCHG RX REV CODE 250 W/ 637 OVERRIDE(OP): Performed by: INTERNAL MEDICINE

## 2024-03-16 PROCEDURE — 700105 HCHG RX REV CODE 258

## 2024-03-16 PROCEDURE — 83735 ASSAY OF MAGNESIUM: CPT | Mod: 91

## 2024-03-16 PROCEDURE — 700111 HCHG RX REV CODE 636 W/ 250 OVERRIDE (IP): Mod: JZ | Performed by: INTERNAL MEDICINE

## 2024-03-16 PROCEDURE — 84100 ASSAY OF PHOSPHORUS: CPT

## 2024-03-16 PROCEDURE — 99291 CRITICAL CARE FIRST HOUR: CPT | Performed by: INTERNAL MEDICINE

## 2024-03-16 PROCEDURE — 93306 TTE W/DOPPLER COMPLETE: CPT

## 2024-03-16 PROCEDURE — 97602 WOUND(S) CARE NON-SELECTIVE: CPT

## 2024-03-16 PROCEDURE — 82962 GLUCOSE BLOOD TEST: CPT | Mod: 91

## 2024-03-16 PROCEDURE — 93306 TTE W/DOPPLER COMPLETE: CPT | Mod: 26 | Performed by: INTERNAL MEDICINE

## 2024-03-16 PROCEDURE — 700101 HCHG RX REV CODE 250: Performed by: INTERNAL MEDICINE

## 2024-03-16 PROCEDURE — 87040 BLOOD CULTURE FOR BACTERIA: CPT

## 2024-03-16 PROCEDURE — 82272 OCCULT BLD FECES 1-3 TESTS: CPT

## 2024-03-16 PROCEDURE — 80048 BASIC METABOLIC PNL TOTAL CA: CPT

## 2024-03-16 PROCEDURE — 700105 HCHG RX REV CODE 258: Performed by: INTERNAL MEDICINE

## 2024-03-16 RX ORDER — SODIUM CHLORIDE, SODIUM LACTATE, POTASSIUM CHLORIDE, CALCIUM CHLORIDE 600; 310; 30; 20 MG/100ML; MG/100ML; MG/100ML; MG/100ML
INJECTION, SOLUTION INTRAVENOUS CONTINUOUS
Status: DISCONTINUED | OUTPATIENT
Start: 2024-03-16 | End: 2024-03-18

## 2024-03-16 RX ORDER — POTASSIUM CHLORIDE 20 MEQ/1
40 TABLET, EXTENDED RELEASE ORAL ONCE
Status: COMPLETED | OUTPATIENT
Start: 2024-03-16 | End: 2024-03-16

## 2024-03-16 RX ORDER — INSULIN LISPRO 100 [IU]/ML
0.2 INJECTION, SOLUTION INTRAVENOUS; SUBCUTANEOUS
Status: DISCONTINUED | OUTPATIENT
Start: 2024-03-16 | End: 2024-03-17

## 2024-03-16 RX ORDER — POTASSIUM CHLORIDE 7.45 MG/ML
10 INJECTION INTRAVENOUS
Status: COMPLETED | OUTPATIENT
Start: 2024-03-16 | End: 2024-03-16

## 2024-03-16 RX ORDER — MAGNESIUM SULFATE HEPTAHYDRATE 40 MG/ML
2 INJECTION, SOLUTION INTRAVENOUS ONCE
Status: COMPLETED | OUTPATIENT
Start: 2024-03-16 | End: 2024-03-16

## 2024-03-16 RX ORDER — OMEPRAZOLE 20 MG/1
20 CAPSULE, DELAYED RELEASE ORAL 2 TIMES DAILY
Status: DISCONTINUED | OUTPATIENT
Start: 2024-03-16 | End: 2024-03-21 | Stop reason: HOSPADM

## 2024-03-16 RX ORDER — DEXTROSE MONOHYDRATE 25 G/50ML
25 INJECTION, SOLUTION INTRAVENOUS
Status: DISCONTINUED | OUTPATIENT
Start: 2024-03-16 | End: 2024-03-17

## 2024-03-16 RX ORDER — INSULIN LISPRO 100 [IU]/ML
2-9 INJECTION, SOLUTION INTRAVENOUS; SUBCUTANEOUS
Status: DISCONTINUED | OUTPATIENT
Start: 2024-03-16 | End: 2024-03-17

## 2024-03-16 RX ADMIN — POTASSIUM CHLORIDE 10 MEQ: 7.46 INJECTION, SOLUTION INTRAVENOUS at 08:17

## 2024-03-16 RX ADMIN — POTASSIUM CHLORIDE 40 MEQ: 1500 TABLET, EXTENDED RELEASE ORAL at 11:24

## 2024-03-16 RX ADMIN — OXYCODONE 5 MG: 5 TABLET ORAL at 08:12

## 2024-03-16 RX ADMIN — INSULIN LISPRO 3 UNITS: 100 INJECTION, SOLUTION INTRAVENOUS; SUBCUTANEOUS at 21:55

## 2024-03-16 RX ADMIN — DIBASIC SODIUM PHOSPHATE, MONOBASIC POTASSIUM PHOSPHATE AND MONOBASIC SODIUM PHOSPHATE 500 MG: 852; 155; 130 TABLET ORAL at 17:57

## 2024-03-16 RX ADMIN — ACETAMINOPHEN 650 MG: 325 TABLET, FILM COATED ORAL at 18:06

## 2024-03-16 RX ADMIN — POTASSIUM CHLORIDE 10 MEQ: 7.46 INJECTION, SOLUTION INTRAVENOUS at 07:19

## 2024-03-16 RX ADMIN — OXYCODONE 5 MG: 5 TABLET ORAL at 21:59

## 2024-03-16 RX ADMIN — POTASSIUM CHLORIDE 10 MEQ: 7.46 INJECTION, SOLUTION INTRAVENOUS at 02:11

## 2024-03-16 RX ADMIN — CEFAZOLIN 2 G: 10 INJECTION, POWDER, FOR SOLUTION INTRAVENOUS at 13:16

## 2024-03-16 RX ADMIN — INSULIN LISPRO 2 UNITS: 100 INJECTION, SOLUTION INTRAVENOUS; SUBCUTANEOUS at 18:02

## 2024-03-16 RX ADMIN — POTASSIUM CHLORIDE 10 MEQ: 7.46 INJECTION, SOLUTION INTRAVENOUS at 03:22

## 2024-03-16 RX ADMIN — CEFAZOLIN 2 G: 10 INJECTION, POWDER, FOR SOLUTION INTRAVENOUS at 22:00

## 2024-03-16 RX ADMIN — DIBASIC SODIUM PHOSPHATE, MONOBASIC POTASSIUM PHOSPHATE AND MONOBASIC SODIUM PHOSPHATE 500 MG: 852; 155; 130 TABLET ORAL at 13:16

## 2024-03-16 RX ADMIN — NYSTATIN: 100000 POWDER TOPICAL at 18:00

## 2024-03-16 RX ADMIN — DIBASIC SODIUM PHOSPHATE, MONOBASIC POTASSIUM PHOSPHATE AND MONOBASIC SODIUM PHOSPHATE 500 MG: 852; 155; 130 TABLET ORAL at 22:00

## 2024-03-16 RX ADMIN — POTASSIUM PHOSPHATE, MONOBASIC AND POTASSIUM PHOSPHATE, DIBASIC 30 MMOL: 224; 236 INJECTION, SOLUTION, CONCENTRATE INTRAVENOUS at 10:22

## 2024-03-16 RX ADMIN — SODIUM CHLORIDE 8 UNITS/HR: 9 INJECTION, SOLUTION INTRAVENOUS at 06:27

## 2024-03-16 RX ADMIN — OXYCODONE 5 MG: 5 TABLET ORAL at 14:18

## 2024-03-16 RX ADMIN — POTASSIUM CHLORIDE 10 MEQ: 7.46 INJECTION, SOLUTION INTRAVENOUS at 00:26

## 2024-03-16 RX ADMIN — DEXTROSE AND SODIUM CHLORIDE: 10; .45 INJECTION, SOLUTION INTRAVENOUS at 06:28

## 2024-03-16 RX ADMIN — ACETAMINOPHEN 650 MG: 325 TABLET, FILM COATED ORAL at 04:53

## 2024-03-16 RX ADMIN — DIBASIC SODIUM PHOSPHATE, MONOBASIC POTASSIUM PHOSPHATE AND MONOBASIC SODIUM PHOSPHATE 500 MG: 852; 155; 130 TABLET ORAL at 11:24

## 2024-03-16 RX ADMIN — OMEPRAZOLE 20 MG: 20 CAPSULE, DELAYED RELEASE ORAL at 17:57

## 2024-03-16 RX ADMIN — CEFAZOLIN 2 G: 10 INJECTION, POWDER, FOR SOLUTION INTRAVENOUS at 06:09

## 2024-03-16 RX ADMIN — ACETAMINOPHEN 650 MG: 325 TABLET, FILM COATED ORAL at 11:24

## 2024-03-16 RX ADMIN — MAGNESIUM SULFATE HEPTAHYDRATE 2 G: 2 INJECTION, SOLUTION INTRAVENOUS at 08:15

## 2024-03-16 RX ADMIN — POTASSIUM CHLORIDE 10 MEQ: 7.46 INJECTION, SOLUTION INTRAVENOUS at 06:11

## 2024-03-16 RX ADMIN — DEXTROSE AND SODIUM CHLORIDE: 10; .45 INJECTION, SOLUTION INTRAVENOUS at 10:20

## 2024-03-16 RX ADMIN — OXYCODONE 5 MG: 5 TABLET ORAL at 01:10

## 2024-03-16 RX ADMIN — INSULIN GLARGINE-YFGN 18 UNITS: 100 INJECTION, SOLUTION SUBCUTANEOUS at 11:28

## 2024-03-16 RX ADMIN — NYSTATIN: 100000 POWDER TOPICAL at 06:10

## 2024-03-16 RX ADMIN — SODIUM CHLORIDE, POTASSIUM CHLORIDE, SODIUM LACTATE AND CALCIUM CHLORIDE: 600; 310; 30; 20 INJECTION, SOLUTION INTRAVENOUS at 13:17

## 2024-03-16 RX ADMIN — POTASSIUM CHLORIDE 10 MEQ: 7.46 INJECTION, SOLUTION INTRAVENOUS at 09:20

## 2024-03-16 ASSESSMENT — PAIN DESCRIPTION - PAIN TYPE
TYPE: ACUTE PAIN;CHRONIC PAIN
TYPE: ACUTE PAIN
TYPE: ACUTE PAIN;CHRONIC PAIN
TYPE: ACUTE PAIN
TYPE: ACUTE PAIN;CHRONIC PAIN
TYPE: ACUTE PAIN
TYPE: ACUTE PAIN

## 2024-03-16 ASSESSMENT — ENCOUNTER SYMPTOMS
NAUSEA: 1
SHORTNESS OF BREATH: 0
COUGH: 0
SPUTUM PRODUCTION: 0
DIZZINESS: 0
BACK PAIN: 1
NECK PAIN: 1
SENSORY CHANGE: 0
MYALGIAS: 0
BLURRED VISION: 0
STRIDOR: 0
BLOOD IN STOOL: 1
VOMITING: 1
ABDOMINAL PAIN: 1
CHILLS: 0
FOCAL WEAKNESS: 0
FEVER: 0

## 2024-03-16 NOTE — CARE PLAN
The patient is Watcher - Medium risk of patient condition declining or worsening    Shift Goals  Clinical Goals: Transition off DKA flow  Patient Goals: rest, comfort, pain management  Family Goals: updates, education    Progress made toward(s) clinical / shift goals:      Problem: Pain - Standard  Goal: Alleviation of pain or a reduction in pain to the patient’s comfort goal  Outcome: Progressing     Problem: Knowledge Deficit - Standard  Goal: Patient and family/care givers will demonstrate understanding of plan of care, disease process/condition, diagnostic tests and medications  Outcome: Progressing     Problem: Psychosocial  Goal: Patient's level of anxiety will decrease  Outcome: Progressing

## 2024-03-16 NOTE — DIETARY
Nutrition Services: Diabetes Education Consult   Day 2 of admit.  Tamera Jha is a 48 y.o. female with admitting DX of DKA, type 2, not at goal    RD able to visit pt at bedside to provide consistent CHO diet education. RD discussed CHO amounts with meals, adequate protein and hs snacks. RD also discussed sugar free beverages with pt, as well as reading a food label.  RD provided education booklet reinforcing topics discussed. Pt demonstrated appropriate readiness and adequate evidence of learning. RD able to answer all questions to patient's satisfaction.     No other education needs identified at this time. Consider referral to outpatient nutrition services for continuation of education as indicated or per pt preferences.     Please re-consult RD as indicated.

## 2024-03-16 NOTE — PROGRESS NOTES
4 Eyes Skin Assessment Completed by Nick RN and LISE Mayen.    Head WDL  Ears WDL  Nose WDL  Mouth WDL  Neck WDL  Breast/Chest WDL  Shoulder Blades WDL  Spine WDL  (R) Arm/Elbow/Hand WDL  (L) Arm/Elbow/Hand WDL  Abdomen WDL  Groin Redness and Blanching  Scrotum/Coccyx/Buttocks Scabbed, CDI  (R) Leg WDL  (L) Leg WDL  (R) Heel/Foot/Toe WDL  (L) Heel/Foot/Toe WDL          Devices In Places Tele Box, Blood Pressure Cuff, Pulse Ox, and Zheng      Interventions In Place Q2 Turns and Low Air Loss Mattress    Possible Skin Injury No    Pictures Uploaded Into Epic N/A  Wound Consult Placed N/A  RN Wound Prevention Protocol Ordered No

## 2024-03-16 NOTE — CARE PLAN
The patient is Stable - Low risk of patient condition declining or worsening    Shift Goals  Clinical Goals: blood sugar management  Patient Goals: rest, less pain  Family Goals: MONICA    Progress made toward(s) clinical / shift goals:    Problem: Pain - Standard  Goal: Alleviation of pain or a reduction in pain to the patient’s comfort goal  Description: Target End Date:  Prior to discharge or change in level of care    Document on Vitals flowsheet    1.  Document pain using the appropriate pain scale per order or unit policy  2.  Educate and implement non-pharmacologic comfort measures (i.e. relaxation, distraction, massage, cold/heat therapy, etc.)  3.  Pain management medications as ordered  4.  Reassess pain after pain med administration per policy  5.  If opiods administered assess patient's response to pain medication is appropriate per POSS sedation scale  6.  Follow pain management plan developed in collaboration with patient and interdisciplinary team (including palliative care or pain specialists if applicable)  Outcome: Progressing  Note: Pain monitored throughout shift using 0-10 pain scale, interventions as needed

## 2024-03-16 NOTE — WOUND TEAM
"[facility]  Inpatient Services  Initial Wound and Skin Care Evaluation    Admission Date: 3/14/2024     Last order of IP CONSULT TO WOUND CARE was found on 3/15/2024 from Hospital Encounter on 3/14/2024     HPI, PMH, SH: Reviewed    Past Surgical History:   Procedure Laterality Date    APPENDECTOMY      CHOLECYSTECTOMY      RHINOPLASTY      TONSILLECTOMY       Social History     Tobacco Use    Smoking status: Never    Smokeless tobacco: Never   Substance Use Topics    Alcohol use: No     Chief Complaint   Patient presents with    High Blood Sugar     Diagnosis: DKA, type 2, not at goal (HCC) [E11.10]    Unit where seen by Wound Team: T619/00     WOUND CONSULT RELATED TO:  Right posterior thigh    WOUND TEAM PLAN OF CARE - Frequency of Follow-up:   Nursing to follow dressing orders written for wound care. Contact wound team if area fails to progress, deteriorates or with any questions/concerns if something comes up before next scheduled follow up (See below as to whether wound is following and frequency of wound follow up)   Not following, consult as needed  - Right posterior thigh - suspected bacterial lesion    WOUND HISTORY:   \"49 yo F with a PMHx of NIDDM who presents with hyperglycemia. Patient is transitioning between [place] and [place] and her physician 5 days ago transitioned her back from metformin to 15 units of long-acting insulin without a sliding scale or other medications. Since then, she has had progressively worsening polyuria, polydipsia, polyphagia, nausea and abdominal pain. She was evaluated in the outpatient setting today and was found to be hyperglycemic and was transferred to [facility] via ambulance while administered 500 cc of crystalloid. In the emergency department, patient was found to be in recurrent DKA and was given IV fluids and insulin. She had an elevated WBC but no obvious signs of infection so antibiotics have been withheld and cultures have been drawn as well as a " "procalcitonin. Patient reports being in DKA 3 weeks ago for which she was admitted to the hospital down in [place] again due to transitioning between oral and subcutaneous medications for her diabetes. She denies any recent fever, chills, cough, URI symptoms, diarrhea or rash. She had COVID 3 weeks ago as well.\"        WOUND ASSESSMENT/LDA  Moisture Associated Skin Damage 03/14/24 Groin (Active)   First Observed Date: 03/14/24   Present on Original Admission: Yes  Laterality: Bilateral  Wound Location : Groin      Assessments 3/16/2024 10:00 AM   Wound Image     NEXT Weekly Photo (Inpatient Only) 03/23/24   Drainage Amount Scant   Drainage Description Serous   Periwound Assessment Red;Denuded;Maceration;Blanchable erythema;Fragile;Painful;Rash   IAD Cleansing Foam Cleanser/Washcloth   Periwound Protectant Antifungal Therapy   IAD Containment Device Indwelling Catheter   Length (cm) 6   Width (cm) 3   WOUND NURSE ONLY - Time Spent with Patient (mins) 30       Wound 03/16/24 Other (comment) Thigh Posterior Right bacterial lesion (Active)   Date First Assessed: 03/16/24   Present on Original Admission: Yes  Hand Hygiene Completed: Yes  Primary Wound Type: Other (comment)  Location: Thigh  Wound Orientation: Posterior  Laterality: Right  Wound Description (Comments): bacterial lesion      Assessments 3/16/2024 10:00 AM   Wound Image     Site Assessment Dry;Red   Periwound Assessment Clean;Dry;Intact;Scabbed   Margins Attached edges;Defined edges   Closure Secondary intention   Drainage Amount None   Treatments Cleansed;Nonselective debridement;Site care;Offloading   Wound Cleansing Foam Cleanser/Washcloth   Periwound Protectant No-sting Skin Prep   Dressing Status Clean;Dry;Intact   Dressing Changed New   Dressing Cleansing/Solutions Not Applicable   Dressing Options Silicone Adhesive Foam   Dressing Change/Treatment Frequency Every 72 hrs, and As Needed   NEXT Dressing Change/Treatment Date 03/19/24   NEXT Weekly " Photo (Inpatient Only) 03/22/24   Wound Team Following Not following   Non-staged Wound Description Partial thickness   Wound Length (cm) 0.3 cm   Wound Width (cm) 0.3 cm   Wound Depth (cm) 0.1 cm   Wound Surface Area (cm^2) 0.09 cm^2   Wound Volume (cm^3) 0.009 cm^3   Wound Bed Eschar (%) 100 %   Shape round   Wound Odor None                Sacrum and bilateral heels are clean, dry, intact, and blanching.   Vascular:    JESE:   No results found.    Lab Values:    Lab Results   Component Value Date/Time    WBC 25.2 (H) 03/15/2024 04:11 AM    RBC 4.20 03/15/2024 04:11 AM    HEMOGLOBIN 12.9 03/15/2024 04:11 AM    HEMATOCRIT 39.3 03/15/2024 04:11 AM    HBA1C 11.1 (H) 03/14/2024 11:26 AM         Culture Results show:  No results found for this or any previous visit (from the past 720 hour(s)).    Pain Level/Medicated:  None, Tolerated without pain medication       INTERVENTIONS BY WOUND TEAM:  Chart and images reviewed. Discussed with bedside RN. All areas of concern (based on picture review, LDA review and discussion with bedside RN) have been thoroughly assessed. Documentation of areas based on significant findings. This RN in to assess patient. Performed standard wound care which includes appropriate positioning, dressing removal and non-selective debridement. Pictures and measurements obtained weekly if/when required.    Wound:  Right posterior thigh - suspected bacterial lesion  Preparation for Dressing removal: Open to air  Cleansed/Non-selectively Debrided with:  No rinse foam soap and Moist warm washcloth  Aliyah wound: Cleansed with No rinse foam soap and Moist warm washcloth, Prepped with No Sting  Primary Dressing:  silicone adhesive dressing     Wound:  Bilateral groin - moisture associated dermatitis  Preparation for Dressing removal: Open to air  Cleansed/Non-selectively Debrided with:  Perineal Wipes (Barrier wipes)  Aliyah wound: Cleansed with Perineal Wipes (Barrier wipes), Prepped with Nystatin  Powder  Primary Dressing:  Interdry cloths ordered    Advanced Wound Care Discharge Planning  Number of Clinicians necessary to complete wound care: 1  Is patient requiring IV pain medications for dressing changes:  No   Length of time for dressing change 30 min. (This does not include chart review, pre-medication time, set up, clean up or time spent charting.)    Interdisciplinary consultation: Patient, Bedside RN Zack)    EVALUATION / RATIONALE FOR TREATMENT:     Date:  03/16/24  Wound Status:  Initial evaluation    -Patient with healing scabbed lesion to right posterior thigh, suspected bacterial. Cleansed and applied no sting and covered with silicone dressing to protect area.     -Suspected moisture associated dermatitis to bilateral groin - blanching erythema and painful denuded tissue, nystatin powder applied and Interdry cloths ordered.           Goals: Steady decrease in wound area and depth weekly.    NURSING PLAN OF CARE ORDERS:  Dressing changes: See Dressing Care orders  Skin care: See Skin Care orders    NUTRITION RECOMMENDATIONS   Wound Team Recommendations:  N/A    DIET ORDERS (From admission to next 24h)       Start     Ordered    03/14/24 1411  Diet NPO Restrict to: Ice Chips  ALL MEALS        Question:  Diet NPO Restrict to:  Answer:  Ice Chips    03/14/24 1412                    PREVENTATIVE INTERVENTIONS:    Q shift Herbie - performed per nursing policy  Q shift pressure point assessments - performed per nursing policy    Containment/Moisture Prevention    Zheng Catheter - Currently in Place  Antifungal treatment - Currently in Place  Interdry - Ordered    Anticipated discharge plans:  TBD        Vac Discharge Needs:  Vac Discharge plan is purely a recommendation from wound team and not a requirement for discharge unless otherwise stated by physician.  Not Applicable Pt not on a wound vac

## 2024-03-16 NOTE — CONSULTS
[facility] IN[facility]IENT CONSULT NOTE     Date of Service: 3/16/2024    Consult Requested By: Sterling Stevenson Jr., D.O.    Reason for Consultation: Bacteremia    Chief Complaint: Abdominal pain, nausea    History of Present Illness:     Tamera Jha is a 48 y.o. female admitted 3/14/2024.  Patient with uncontrolled type 2 diabetes, transitioning between PCOS and renal, presented with worsening polyuria, polydipsia, polyphagia, nausea, abdominal pain, was found to be in DKA was also recently (in DKA.  In [facility] about 3 weeks prior that required admission), Tmax 99.7, white count of 30.3 with 5-10 white cells on UA, negative procalcitonin, was started on IV Ancef.  1 out of 2 blood cultures from admission positive for group B Strep.  CT abdomen and pelvis with mild patchy decreased attenuation in the right kidney suspicious for pyelonephritis.  Chest x-ray with no acute significant process noted.  Patient notes that she also has hematemesis and bright red blood per rectum occasionally, also with a new neck pain.  She does have abdominal and pelvic pain, more posteriorly.    Review of Systems:  All other systems reviewed and are negative expect as noted in HPI    Past Medical History:   Diagnosis Date    Diabetes (HCC)     GDM, class B1 3/31/2016    Hypertension     preeclampsia     Polyhydramnios in third trimester, antepartum complication 3/31/2016       Past Surgical History:   Procedure Laterality Date    APPENDECTOMY      CHOLECYSTECTOMY      RHINOPLASTY      TONSILLECTOMY         Family History   Problem Relation Age of Onset    Hypertension Mother     Diabetes Mother     Other Father     Heart Disease Father        Social History     Socioeconomic History    Marital status: Single     Spouse name: Not on file    Number of children: Not on file    Years of education: Not on file    Highest education level: Not on file   Occupational History    Not on file   Tobacco Use    Smoking status: Never     Smokeless tobacco: Never   Substance and Sexual Activity    Alcohol use: No    Drug use: No    Sexual activity: Yes     Partners: Male     Comment: none    Other Topics Concern    Not on file   Social History Narrative    Not on file     Social Determinants of Health     Financial Resource Strain: Not on file   Food Insecurity: Not on file   Transportation Needs: Not on file   Physical Activity: Not on file   Stress: Not on file   Social Connections: Not on file   Intimate Partner Violence: Not on file   Housing Stability: Not on file       Allergies   Allergen Reactions    Levaquin [Levofloxacin] Hives       Medications:    Current Facility-Administered Medications:     potassium chloride (Kcl) ivpb 10 mEq, 10 mEq, Intravenous, Q HOUR, VICENTA Maxwell Jr..O., Stopped at 03/16/24 1020    potassium phosphate IVPB 30 mmol in 500 mL D5W (premix), 30 mmol, Intravenous, Once, VICENTA Maxwell Jr..O.    magnesium sulfate IVPB premix 2 g, 2 g, Intravenous, Once, VICENTA Maxwell Jr..OGarth, Stopped at 03/16/24 1015    ceFAZolin (Ancef) in SWFI IV syringe 2 g, 2 g, Intravenous, Q8HRS, VICENTA Maxwell Jr..O., 2 g at 03/16/24 0609    LR (Bolus) infusion 500 mL, 500 mL, Intravenous, Once PRN, VICENTA Maxwell Jr..O.    oxyCODONE immediate-release (Roxicodone) tablet 5 mg, 5 mg, Oral, Q6HRS PRN, 5 mg at 03/16/24 0812 **OR** oxyCODONE immediate release (Roxicodone) tablet 10 mg, 10 mg, Oral, Q6HRS PRN, Ary Sawyer M.D.    D10%-0.45% NaCl infusion, , Intravenous, Continuous, Adán Box D.O., Last Rate: 200 mL/hr at 03/16/24 0628, New Bag at 03/16/24 0628    MD ALERT-PHARMACY TO CONSULT FOR DKA MONITORING 1 Each, 1 Each, Other, PRN, Adán Box D.O.    Adult DKA potassium(K+) replacement scale, 1 Each, Intravenous, Q4HRS, Adán Box D.O., 1 Each at 03/16/24 0600    enoxaparin (Lovenox) inj 40 mg, 40 mg, Subcutaneous, DAILY AT 1800, Adán Box D.O., 40 mg at 03/15/24 1746    acetaminophen  "(Tylenol) tablet 650 mg, 650 mg, Oral, Q6HRS PRN, Adán Box D.O., 650 mg at 24 0453    senna-docusate (Pericolace Or Senokot S) 8.6-50 MG per tablet 2 Tablet, 2 Tablet, Oral, BID PRN **AND** polyethylene glycol/lytes (Miralax) Packet 1 Packet, 1 Packet, Oral, QDAY PRN, Adán Box D.O.    labetalol (Normodyne/Trandate) injection 10 mg, 10 mg, Intravenous, Q4HRS PRN, Adán Box D.O.    insulin regular (Humulin R) 100 Units in  mL Infusion for DKA, 4 Units/hr, Intravenous, Continuous, Adán Box D.O., Last Rate: 8 mL/hr at 24 0909, 8 Units/hr at 24 0909    nystatin (Mycostatin) powder, , Topical, BID, Jeremy M Gonda, M.D., Given at 24 0610    Physical Exam:   Vital Signs: BP (!) 148/79   Pulse 83   Temp 37.2 °C (99 °F) (Bladder)   Resp 18   Ht 1.651 m (5' 5\")   Wt 90.2 kg (198 lb 13.7 oz)   SpO2 97%   BMI 33.09 kg/m²   Temp  Av.3 °C (97.4 °F)  Min: 32.9 °C (91.2 °F)  Max: 37.6 °C (99.7 °F)  Vital signs reviewed  Constitutional: Patient is oriented to person, place, and time. Appears well-developed and ill-appearing but no acute distress  Head: Atraumatic, normocephalic  Eyes: Conjunctivae normal, EOM intact   Mouth/Throat: Lips without lesions, good dentition  Neck: Neck supple. No masses/lymphadenopathy  Cardiovascular: Normal rate, regular rhythm, no pedal edema.  Pulmonary/Chest: No respiratory distress. Unlabored respiratory effort  Abdominal: Diffuse tenderness  Musculoskeletal: Anasarca  Neurological: Alert and oriented to person, place, and time. No gross cranial nerve deficit. No focal neural deficit noted  Skin: Skin is warm and dry. No rashes or embolic phenomena noted on exposed skin  Psychiatric: Normal mood and affect. Behavior is normal.     LABS:  Recent Labs     24  1126 24  1515 03/15/24  0411   WBC 30.7* 30.3* 25.2*      Recent Labs     24  1126 24  1515 03/15/24  0411   HEMOGLOBIN 14.7 13.7 12.9   HEMATOCRIT " "49.7* 45.4 39.3   .7* 99.6* 93.6   MCH 30.4 30.0 30.7   MACROCYTOSIS 1+  --   --    ANISOCYTOSIS 1+  --   --    PLATELETCT 367 358 203       Recent Labs     03/15/24  1725 03/15/24  2201 03/16/24  0441   SODIUM 134* 136 139   POTASSIUM 3.7 3.1* 3.3*   CHLORIDE 107 110 112   CO2 14* 15* 17*   CREATININE 0.69 0.61 0.61        Recent Labs     03/14/24  1126 03/14/24  1515 03/15/24  0001   ALBUMIN 3.9 3.8 3.9        MICRO:  No results found for: \"BLOODCULTU\", \"BLDCULT\", \"BCHOLD\"     Latest pertinent labs were reviewed    IMAGING STUDIES:  As above    Hospital Course/Assessment:   Tamera Jha is a 48 y.o. female patient with uncontrolled type 2 diabetes, admitted with DKA and found to have right-sided pyelonephritis with group B Strep bacteremia    Pertinent Diagnoses:  Group B Strep bacteremia  Right-sided pyonephritis  DKA  Uncontrolled type 2 diabetes  Transaminase elevation    Plan:   -Agree with IV Ancef 2 g every 8 hours  -Follow repeat blood cultures x 2 from 3/16, pending  -TTE ordered in place  -HIV and hepatitis C antibodies in a.m., UDS  -MRI of the cervical and thoracic spine with contrast    Disposition: Unable to determine at this time  Need for PICC line: Unable to determine at this time    Plan was discussed with the primary team, Dr. Stevenson.  ID will follow-up with this illness poses threat to life    Ethan Berrios M.D.    Please note that this dictation was created using voice recognition software. I have worked with technical experts from [facility]  [employer] to optimize the interface.  I have made every reasonable attempt to correct obvious errors, but there may be errors of grammar and possibly content that I did not discover before finalizing the note.    "

## 2024-03-16 NOTE — PROGRESS NOTES
"Critical Care Progress Note    Date of admission  3/14/2024    Chief Complaint  48 y.o. female admitted 3/14/2024 with DKA, bacteremia    Hospital Course  \"49 yo F with a PMHx of NIDDM who presents with hyperglycemia.  Patient is transitioning between [place] and [place] and her physician 5 days ago transitioned her back from metformin to 15 units of long-acting insulin without a sliding scale or other medications.  Since then, she has had progressively worsening polyuria, polydipsia, polyphagia, nausea and abdominal pain.  She was evaluated in the outpatient setting today and was found to be hyperglycemic and was transferred to [facility] via ambulance while administered 500 cc of crystalloid.  In the emergency department, patient was found to be in recurrent DKA and was given IV fluids and insulin.  She had an elevated WBC but no obvious signs of infection so antibiotics have been withheld and cultures have been drawn as well as a procalcitonin.  Patient reports being in DKA 3 weeks ago for which she was admitted to the hospital down in [place] again due to transitioning between oral and subcutaneous medications for her diabetes.  She denies any recent fever, chills, cough, URI symptoms, diarrhea or rash.  She had COVID 3 weeks ago as well.\"    Interval Problem Update  Reviewed last 24 hour events:   -No acute events overnight, approaching ability to transition to sliding scale insulin   - Neuro: AOx4   - HR: 80s-90s   - SBP: 130s-160s   - GI: NPO, last BM PTA   - UOP: 3.9 L/24 hrs, +5.4L   - Zheng: yes   - Tm: 37.6   - Lines: PIV   - PPx: GI not indicated, DVT lovenox   - RA   - CXR (personally reviewed and compared to prior): no new   - Replace K, mag, phos   - c/w neck pain MRI cervical/thoracic spine ordered    Yesterday              - remains acidotic              - Neuro: AOx4              - HR: 90s-110s              - SBP: 100s-140s              - GI: NPO, last BM PTA              - UOP: 2.3 L/24 hrs, net " +5L              - Zheng: no              - Tm: 37.1              - Lines: PIV              - PPx: GI not indicated, DVT lovenox              - RA              - CXR (personally reviewed and compared to prior): no new              - RUQ US with fatty liver              - Mag, phos    Review of Systems  Review of Systems   Constitutional:  Positive for malaise/fatigue. Negative for chills and fever.   Eyes:  Negative for blurred vision.   Respiratory:  Negative for cough, sputum production, shortness of breath and stridor.    Cardiovascular:  Negative for chest pain.   Gastrointestinal:  Positive for abdominal pain, blood in stool, nausea and vomiting.   Genitourinary:  Positive for dysuria and frequency.   Musculoskeletal:  Positive for back pain and neck pain. Negative for myalgias.   Skin:  Negative for rash.   Neurological:  Negative for dizziness, sensory change and focal weakness.        Vital Signs for last 24 hours   Temp:  [37.2 °C (99 °F)-37.6 °C (99.7 °F)] 37.2 °C (99 °F)  Pulse:  [] 85  Resp:  [15-34] 27  BP: (132-167)/(64-90) 158/76  SpO2:  [94 %-99 %] 96 %    Hemodynamic parameters for last 24 hours       Respiratory Information for the last 24 hours       Physical Exam   Physical Exam  Vitals and nursing note reviewed.   Constitutional:       Appearance: She is obese. She is ill-appearing.   HENT:      Head: Normocephalic and atraumatic.      Right Ear: External ear normal.      Left Ear: External ear normal.      Mouth/Throat:      Mouth: Mucous membranes are moist.      Pharynx: Oropharynx is clear.   Eyes:      Extraocular Movements: Extraocular movements intact.      Conjunctiva/sclera: Conjunctivae normal.   Cardiovascular:      Rate and Rhythm: Normal rate and regular rhythm.      Pulses: Normal pulses.   Pulmonary:      Effort: Pulmonary effort is normal.   Abdominal:      General: There is distension.      Palpations: Abdomen is soft.      Tenderness: There is abdominal tenderness.    Musculoskeletal:         General: Normal range of motion.      Cervical back: Neck supple.      Right lower leg: Edema present.      Left lower leg: Edema present.   Skin:     General: Skin is warm and dry.      Capillary Refill: Capillary refill takes less than 2 seconds.   Neurological:      General: No focal deficit present.      Mental Status: She is alert and oriented to person, place, and time. Mental status is at baseline.      Cranial Nerves: No cranial nerve deficit.      Sensory: No sensory deficit.      Motor: No weakness.   Psychiatric:         Mood and Affect: Mood normal.         Behavior: Behavior normal.         Medications  Current Facility-Administered Medications   Medication Dose Route Frequency Provider Last Rate Last Admin    potassium chloride (Kcl) ivpb 10 mEq  10 mEq Intravenous Q HOUR VICENTA Maxwell Jr..O.   Stopped at 03/16/24 0759    potassium phosphate IVPB 30 mmol in 500 mL D5W (premix)  30 mmol Intravenous Once VICENTA Maxwell Jr..O.        magnesium sulfate IVPB premix 2 g  2 g Intravenous Once VICENTA Maxwell Jr..O.        ceFAZolin (Ancef) in SWFI IV syringe 2 g  2 g Intravenous Q8HRS VICENTA Maxwell Jr..O.   2 g at 03/16/24 0609    LR (Bolus) infusion 500 mL  500 mL Intravenous Once PRN VICENTA Maxwell Jr..O.        oxyCODONE immediate-release (Roxicodone) tablet 5 mg  5 mg Oral Q6HRS PRN Ary Sawyer M.D.   5 mg at 03/16/24 0110    Or    oxyCODONE immediate release (Roxicodone) tablet 10 mg  10 mg Oral Q6HRS PRN Ary Sawyer M.D.        D10%-0.45% NaCl infusion   Intravenous Continuous Adán Box D.O. 200 mL/hr at 03/16/24 0628 New Bag at 03/16/24 0628    MD ALERT-PHARMACY TO CONSULT FOR DKA MONITORING 1 Each  1 Each Other PRN Adán Box D.O.        Adult DKA potassium(K+) replacement scale  1 Each Intravenous Q4HRS Adán Box D.O.   1 Each at 03/16/24 0600    enoxaparin (Lovenox) inj 40 mg  40 mg Subcutaneous DAILY AT 1800  MIKA AmesOGarth   40 mg at 03/15/24 1746    acetaminophen (Tylenol) tablet 650 mg  650 mg Oral Q6HRS PRN Aádn Box D.O.   650 mg at 03/16/24 0453    senna-docusate (Pericolace Or Senokot S) 8.6-50 MG per tablet 2 Tablet  2 Tablet Oral BID PRN Adán Box D.O.        And    polyethylene glycol/lytes (Miralax) Packet 1 Packet  1 Packet Oral QDAY PRN Adán Box D.O.        labetalol (Normodyne/Trandate) injection 10 mg  10 mg Intravenous Q4HRS PRN Adán Box D.O.        insulin regular (Humulin R) 100 Units in  mL Infusion for DKA  4 Units/hr Intravenous Continuous Adán Box D.O. 8 mL/hr at 03/16/24 0806 8 Units/hr at 03/16/24 0806    nystatin (Mycostatin) powder   Topical BID Jeremy M Gonda, M.D.   Given at 03/16/24 0610       Fluids    Intake/Output Summary (Last 24 hours) at 3/16/2024 0812  Last data filed at 3/16/2024 0600  Gross per 24 hour   Intake 3968.48 ml   Output 3500 ml   Net 468.48 ml       Laboratory  Recent Labs     03/15/24  0411   [id number] n/a         Recent Labs     03/15/24  1057 03/15/24  1440 03/15/24  1725 03/15/24  2201 03/16/24  0441   SODIUM 137   < > 134* 136 139   POTASSIUM 3.6   < > 3.7 3.1* 3.3*   CHLORIDE 111   < > 107 110 112   CO2 12*   < > 14* 15* 17*   BUN 16   < > 11 9 7*   CREATININE 0.79   < > 0.69 0.61 0.61   MAGNESIUM 1.9  --  1.8  --  1.8   PHOSPHORUS 1.9*  --  4.1  --  2.0*   CALCIUM 7.6*   < > 7.5* 7.4* 7.6*    < > = values in this interval not displayed.     Recent Labs     03/14/24  1126 03/14/24  1515 03/14/24  1740 03/15/24  0001 03/15/24  0411 03/15/24  1725 03/15/24  2201 03/16/24  0441   ALTSGPT 101* 98*  --  99*  --   --   --   --    ASTSGOT 199* 199*  --  144*  --   --   --   --    ALKPHOSPHAT 223* 201*  --  189*  --   --   --   --    TBILIRUBIN 0.2 0.2  --  0.2  --   --   --   --    GLUCOSE 485* 365*   < > 164*   < > 228* 136* 91    < > = values in this interval not displayed.     Recent Labs     03/14/24  1126  03/14/24  1515 03/15/24  0001 03/15/24  0411   WBC 30.7* 30.3*  --  25.2*   NEUTSPOLYS 71.80 80.70*  --   --    LYMPHOCYTES 11.10* 5.30*  --   --    MONOCYTES 7.70 10.50  --   --    EOSINOPHILS 0.80 0.00  --   --    BASOPHILS 2.60* 0.90  --   --    ASTSGOT 199* 199* 144*  --    ALTSGPT 101* 98* 99*  --    ALKPHOSPHAT 223* 201* 189*  --    TBILIRUBIN 0.2 0.2 0.2  --      Recent Labs     03/14/24  1126 03/14/24  1515 03/15/24  0411   RBC 4.84 4.56 4.20   HEMOGLOBIN 14.7 13.7 12.9   HEMATOCRIT 49.7* 45.4 39.3   PLATELETCT 367 358 203       Imaging  X-Ray:  I have personally reviewed the images and compared with prior images.  CT:    Reviewed    Assessment/Plan  * DKA, type 2, not at goal (HCC)- (present on admission)  Assessment & Plan  Likely caused by infection and medication changes  ICU admission, cardiac monitoring  Continue to optimize intravascular volume with IVF boluses  DKA protocol with insulin drip at 0.05 units/kg/hr  Every hour Accu-Cheks, every 4 hour BMP, mag, phos  Goal Magnesium: >2, Phosphorus: 2, K >4  Will transition to sliding scale insulin when anion gap <12, CO2 > 17    Will attempt transition to sliding scale today    Bacteremia due to group B Streptococcus- (present on admission)  Assessment & Plan  Likely urinary source with CT scan now showing right pyelonephritis  Continue Ancef  Repeat cultures pending  Echocardiogram pending  ID consult  MRI cervical and thoracic spine    Pyelonephritis- (present on admission)  Assessment & Plan  Right pyelonephritis with GBS bacteremia  Likely same organism  Continue Ancef    Blood in stool- (present on admission)  Assessment & Plan  Pending occult blood    Sepsis (HCC)- (present on admission)  Assessment & Plan  This is Sepsis Present on admission  SIRS criteria identified on my evaluation include: Tachycardia, with heart rate greater than 90 BPM, Tachypnea, with respirations greater than 20 per minute, and Leukocytosis, with WBC greater than  12,000  Clinical indicators of end organ dysfunction include Lactic Acid greater than 2  Source is pyelonephritis, bacteremia  Sepsis protocol initiated  Crystalloid Fluid Administration: Fluid resuscitation ordered per standard protocol - 30 mL/kg per current or ideal body weight  IV antibiotics as appropriate for source of sepsis  Reassessment: I have reassessed the patient's hemodynamic status    Acidosis due to type 2 diabetes mellitus (HCC)- (present on admission)  Assessment & Plan  See DKA problem    Transaminitis- (present on admission)  Assessment & Plan  Mildly elevated likely due to underlying conditions  Monitor synthetic function  Trend  Avoid hepatotoxins         VTE:  Lovenox  Ulcer: PPI  Lines: Zheng Catheter  Ongoing indication addressed    I have performed a physical exam and reviewed and updated ROS and Plan today (3/16/2024). In review of yesterday's note (3/15/2024), there are no changes except as documented above.     Discussed patient condition and risk of morbidity and/or mortality with RN, RT, Pharmacy, Charge nurse / hot rounds, Patient, and infectious disease    The patient remains critically ill.  Critical care time = 41 minutes in directly providing and coordinating critical care and extensive data review.  No time overlap and excludes procedures.    Please note that this dictation was created using voice recognition software. The accuracy of the dictation is limited to the abilities of the software. I have made every reasonable attempt to correct obvious errors, but I expect that there are errors of grammar and possibly content that I did not discover before finalizing the note.

## 2024-03-16 NOTE — PROGRESS NOTES
Patient medium sized tar colored stool    Occult blood in stool sent to lab    MD Stevenson updated regarding patient stool sample

## 2024-03-16 NOTE — PROGRESS NOTES
4 Eyes Skin Assessment Completed by LISE Parsons and LISE Diallo.    Head WDL  Ears WDL  Nose WDL  Mouth WDL  Neck WDL  Breast/Chest WDL  Shoulder Blades Redness and Blanching  Spine Redness and Blanching  (R) Arm/Elbow/Hand Redness, Blanching, and Edema  (L) Arm/Elbow/Hand Redness, Blanching, and Edema  Abdomen Redness  Groin Excoriation and Rash  Scrotum/Coccyx/Buttocks Redness and Blanching slow to shaji healing popped blister  (R) Leg WDL  (L) Leg WDL  (R) Heel/Foot/Toe Redness and Blanching  (L) Heel/Foot/Toe Redness and Blanching          Devices In Places ECG, Blood Pressure Cuff, Pulse Ox, and Zheng      Interventions In Place Sacral Mepilex, Pillows, Low Air Loss Mattress, and Pressure Redistribution Mattress    Possible Skin Injury Yes    Pictures Uploaded Into Epic Yes  Wound Consult Placed Yes  RN Wound Prevention Protocol Ordered Yes

## 2024-03-17 ENCOUNTER — APPOINTMENT (OUTPATIENT)
Dept: RADIOLOGY | Facility: MEDICAL CENTER | Age: 49
DRG: 871 | End: 2024-03-17
Attending: INTERNAL MEDICINE
Payer: COMMERCIAL

## 2024-03-17 PROBLEM — B37.31 VAGINAL YEAST INFECTION: Status: ACTIVE | Noted: 2024-03-17

## 2024-03-17 LAB
ANION GAP SERPL CALC-SCNC: 10 MMOL/L (ref 7–16)
BASOPHILS # BLD AUTO: 0.3 % (ref 0–1.8)
BASOPHILS # BLD: 0.02 K/UL (ref 0–0.12)
BUN SERPL-MCNC: 6 MG/DL (ref 8–22)
CALCIUM SERPL-MCNC: 7.9 MG/DL (ref 8.5–10.5)
CHLORIDE SERPL-SCNC: 108 MMOL/L (ref 96–112)
CO2 SERPL-SCNC: 19 MMOL/L (ref 20–33)
CREAT SERPL-MCNC: 0.46 MG/DL (ref 0.5–1.4)
EOSINOPHIL # BLD AUTO: 0.06 K/UL (ref 0–0.51)
EOSINOPHIL NFR BLD: 0.9 % (ref 0–6.9)
ERYTHROCYTE [DISTWIDTH] IN BLOOD BY AUTOMATED COUNT: 47.8 FL (ref 35.9–50)
GFR SERPLBLD CREATININE-BSD FMLA CKD-EPI: 118 ML/MIN/1.73 M 2
GLUCOSE BLD STRIP.AUTO-MCNC: 169 MG/DL (ref 65–99)
GLUCOSE BLD STRIP.AUTO-MCNC: 171 MG/DL (ref 65–99)
GLUCOSE BLD STRIP.AUTO-MCNC: 278 MG/DL (ref 65–99)
GLUCOSE SERPL-MCNC: 198 MG/DL (ref 65–99)
HCT VFR BLD AUTO: 34.5 % (ref 37–47)
HGB BLD-MCNC: 11.9 G/DL (ref 12–16)
HIV 1+2 AB+HIV1 P24 AG SERPL QL IA: NORMAL
IMM GRANULOCYTES # BLD AUTO: 0.04 K/UL (ref 0–0.11)
IMM GRANULOCYTES NFR BLD AUTO: 0.6 % (ref 0–0.9)
LYMPHOCYTES # BLD AUTO: 1.42 K/UL (ref 1–4.8)
LYMPHOCYTES NFR BLD: 20.9 % (ref 22–41)
MAGNESIUM SERPL-MCNC: 1.9 MG/DL (ref 1.5–2.5)
MCH RBC QN AUTO: 30.1 PG (ref 27–33)
MCHC RBC AUTO-ENTMCNC: 34.5 G/DL (ref 32.2–35.5)
MCV RBC AUTO: 87.3 FL (ref 81.4–97.8)
MONOCYTES # BLD AUTO: 0.89 K/UL (ref 0–0.85)
MONOCYTES NFR BLD AUTO: 13.1 % (ref 0–13.4)
NEUTROPHILS # BLD AUTO: 4.38 K/UL (ref 1.82–7.42)
NEUTROPHILS NFR BLD: 64.2 % (ref 44–72)
NRBC # BLD AUTO: 0 K/UL
NRBC BLD-RTO: 0 /100 WBC (ref 0–0.2)
PHOSPHATE SERPL-MCNC: 3.2 MG/DL (ref 2.5–4.5)
PLATELET # BLD AUTO: 143 K/UL (ref 164–446)
PMV BLD AUTO: 9.5 FL (ref 9–12.9)
POTASSIUM SERPL-SCNC: 3.7 MMOL/L (ref 3.6–5.5)
RBC # BLD AUTO: 3.95 M/UL (ref 4.2–5.4)
SODIUM SERPL-SCNC: 137 MMOL/L (ref 135–145)
WBC # BLD AUTO: 6.8 K/UL (ref 4.8–10.8)

## 2024-03-17 PROCEDURE — 99233 SBSQ HOSP IP/OBS HIGH 50: CPT | Mod: GC | Performed by: INTERNAL MEDICINE

## 2024-03-17 PROCEDURE — 700111 HCHG RX REV CODE 636 W/ 250 OVERRIDE (IP): Performed by: INTERNAL MEDICINE

## 2024-03-17 PROCEDURE — 82962 GLUCOSE BLOOD TEST: CPT

## 2024-03-17 PROCEDURE — 700101 HCHG RX REV CODE 250: Performed by: INTERNAL MEDICINE

## 2024-03-17 PROCEDURE — 72157 MRI CHEST SPINE W/O & W/DYE: CPT

## 2024-03-17 PROCEDURE — 84100 ASSAY OF PHOSPHORUS: CPT

## 2024-03-17 PROCEDURE — A9270 NON-COVERED ITEM OR SERVICE: HCPCS | Performed by: HOSPITALIST

## 2024-03-17 PROCEDURE — 700102 HCHG RX REV CODE 250 W/ 637 OVERRIDE(OP): Performed by: HOSPITALIST

## 2024-03-17 PROCEDURE — 80048 BASIC METABOLIC PNL TOTAL CA: CPT

## 2024-03-17 PROCEDURE — 72156 MRI NECK SPINE W/O & W/DYE: CPT

## 2024-03-17 PROCEDURE — A9270 NON-COVERED ITEM OR SERVICE: HCPCS | Performed by: INTERNAL MEDICINE

## 2024-03-17 PROCEDURE — 83735 ASSAY OF MAGNESIUM: CPT

## 2024-03-17 PROCEDURE — 99233 SBSQ HOSP IP/OBS HIGH 50: CPT | Performed by: INTERNAL MEDICINE

## 2024-03-17 PROCEDURE — A9270 NON-COVERED ITEM OR SERVICE: HCPCS

## 2024-03-17 PROCEDURE — 700102 HCHG RX REV CODE 250 W/ 637 OVERRIDE(OP): Performed by: INTERNAL MEDICINE

## 2024-03-17 PROCEDURE — 99254 IP/OBS CNSLTJ NEW/EST MOD 60: CPT | Performed by: HOSPITALIST

## 2024-03-17 PROCEDURE — A9579 GAD-BASE MR CONTRAST NOS,1ML: HCPCS | Performed by: INTERNAL MEDICINE

## 2024-03-17 PROCEDURE — 85025 COMPLETE CBC W/AUTO DIFF WBC: CPT

## 2024-03-17 PROCEDURE — 770020 HCHG ROOM/CARE - TELE (206)

## 2024-03-17 PROCEDURE — 700117 HCHG RX CONTRAST REV CODE 255: Performed by: INTERNAL MEDICINE

## 2024-03-17 PROCEDURE — 700102 HCHG RX REV CODE 250 W/ 637 OVERRIDE(OP)

## 2024-03-17 PROCEDURE — 87389 HIV-1 AG W/HIV-1&-2 AB AG IA: CPT

## 2024-03-17 RX ORDER — POTASSIUM CHLORIDE 20 MEQ/1
20 TABLET, EXTENDED RELEASE ORAL ONCE
Status: COMPLETED | OUTPATIENT
Start: 2024-03-17 | End: 2024-03-17

## 2024-03-17 RX ORDER — CLOTRIMAZOLE 10 MG/1
10 LOZENGE ORAL; TOPICAL
Status: DISCONTINUED | OUTPATIENT
Start: 2024-03-17 | End: 2024-03-18

## 2024-03-17 RX ORDER — FLUCONAZOLE 50 MG/1
150 TABLET ORAL ONCE
Status: COMPLETED | OUTPATIENT
Start: 2024-03-17 | End: 2024-03-17

## 2024-03-17 RX ORDER — DEXTROSE MONOHYDRATE 25 G/50ML
25 INJECTION, SOLUTION INTRAVENOUS
Status: DISCONTINUED | OUTPATIENT
Start: 2024-03-17 | End: 2024-03-19

## 2024-03-17 RX ORDER — INSULIN LISPRO 100 [IU]/ML
2-9 INJECTION, SOLUTION INTRAVENOUS; SUBCUTANEOUS
Status: DISCONTINUED | OUTPATIENT
Start: 2024-03-17 | End: 2024-03-19

## 2024-03-17 RX ORDER — MAGNESIUM SULFATE HEPTAHYDRATE 40 MG/ML
2 INJECTION, SOLUTION INTRAVENOUS ONCE
Status: COMPLETED | OUTPATIENT
Start: 2024-03-17 | End: 2024-03-17

## 2024-03-17 RX ORDER — INSULIN LISPRO 100 [IU]/ML
0.2 INJECTION, SOLUTION INTRAVENOUS; SUBCUTANEOUS
Status: DISCONTINUED | OUTPATIENT
Start: 2024-03-17 | End: 2024-03-19

## 2024-03-17 RX ADMIN — CLOTRIMAZOLE 10 MG: 10 LOZENGE ORAL; TOPICAL at 13:24

## 2024-03-17 RX ADMIN — OXYCODONE 5 MG: 5 TABLET ORAL at 11:32

## 2024-03-17 RX ADMIN — INSULIN GLARGINE-YFGN 18 UNITS: 100 INJECTION, SOLUTION SUBCUTANEOUS at 05:32

## 2024-03-17 RX ADMIN — INSULIN LISPRO 6 UNITS: 100 INJECTION, SOLUTION INTRAVENOUS; SUBCUTANEOUS at 17:36

## 2024-03-17 RX ADMIN — NYSTATIN: 100000 POWDER TOPICAL at 18:37

## 2024-03-17 RX ADMIN — CEFAZOLIN 2 G: 10 INJECTION, POWDER, FOR SOLUTION INTRAVENOUS at 21:34

## 2024-03-17 RX ADMIN — OMEPRAZOLE 20 MG: 20 CAPSULE, DELAYED RELEASE ORAL at 17:32

## 2024-03-17 RX ADMIN — CEFAZOLIN 2 G: 10 INJECTION, POWDER, FOR SOLUTION INTRAVENOUS at 05:24

## 2024-03-17 RX ADMIN — INSULIN LISPRO 5 UNITS: 100 INJECTION, SOLUTION INTRAVENOUS; SUBCUTANEOUS at 17:36

## 2024-03-17 RX ADMIN — CLOTRIMAZOLE 10 MG: 10 LOZENGE ORAL; TOPICAL at 17:30

## 2024-03-17 RX ADMIN — INSULIN LISPRO 6 UNITS: 100 INJECTION, SOLUTION INTRAVENOUS; SUBCUTANEOUS at 08:31

## 2024-03-17 RX ADMIN — GADOTERIDOL 20 ML: 279.3 INJECTION, SOLUTION INTRAVENOUS at 16:33

## 2024-03-17 RX ADMIN — INSULIN LISPRO 2 UNITS: 100 INJECTION, SOLUTION INTRAVENOUS; SUBCUTANEOUS at 21:44

## 2024-03-17 RX ADMIN — OXYCODONE HYDROCHLORIDE 10 MG: 10 TABLET ORAL at 17:31

## 2024-03-17 RX ADMIN — ACETAMINOPHEN 650 MG: 325 TABLET, FILM COATED ORAL at 02:31

## 2024-03-17 RX ADMIN — INSULIN LISPRO 2 UNITS: 100 INJECTION, SOLUTION INTRAVENOUS; SUBCUTANEOUS at 08:32

## 2024-03-17 RX ADMIN — NYSTATIN: 100000 POWDER TOPICAL at 05:25

## 2024-03-17 RX ADMIN — OXYCODONE 5 MG: 5 TABLET ORAL at 05:23

## 2024-03-17 RX ADMIN — INSULIN LISPRO 2 UNITS: 100 INJECTION, SOLUTION INTRAVENOUS; SUBCUTANEOUS at 14:20

## 2024-03-17 RX ADMIN — MAGNESIUM SULFATE HEPTAHYDRATE 2 G: 2 INJECTION, SOLUTION INTRAVENOUS at 08:39

## 2024-03-17 RX ADMIN — CEFAZOLIN 2 G: 10 INJECTION, POWDER, FOR SOLUTION INTRAVENOUS at 17:34

## 2024-03-17 RX ADMIN — OXYCODONE HYDROCHLORIDE 10 MG: 10 TABLET ORAL at 23:24

## 2024-03-17 RX ADMIN — POTASSIUM CHLORIDE 20 MEQ: 1500 TABLET, EXTENDED RELEASE ORAL at 08:34

## 2024-03-17 RX ADMIN — ACETAMINOPHEN 650 MG: 325 TABLET, FILM COATED ORAL at 08:34

## 2024-03-17 RX ADMIN — ACETAMINOPHEN 650 MG: 325 TABLET, FILM COATED ORAL at 21:32

## 2024-03-17 RX ADMIN — ACETAMINOPHEN 650 MG: 325 TABLET, FILM COATED ORAL at 15:41

## 2024-03-17 RX ADMIN — OMEPRAZOLE 20 MG: 20 CAPSULE, DELAYED RELEASE ORAL at 05:23

## 2024-03-17 RX ADMIN — CLOTRIMAZOLE 10 MG: 10 LOZENGE ORAL; TOPICAL at 18:41

## 2024-03-17 RX ADMIN — FLUCONAZOLE 150 MG: 50 TABLET ORAL at 13:25

## 2024-03-17 RX ADMIN — INSULIN LISPRO 6 UNITS: 100 INJECTION, SOLUTION INTRAVENOUS; SUBCUTANEOUS at 14:21

## 2024-03-17 RX ADMIN — CLOTRIMAZOLE 10 MG: 10 LOZENGE ORAL; TOPICAL at 21:47

## 2024-03-17 ASSESSMENT — PAIN DESCRIPTION - PAIN TYPE
TYPE: ACUTE PAIN
TYPE: CHRONIC PAIN;ACUTE PAIN
TYPE: ACUTE PAIN
TYPE: ACUTE PAIN
TYPE: ACUTE PAIN;CHRONIC PAIN
TYPE: ACUTE PAIN

## 2024-03-17 ASSESSMENT — PATIENT HEALTH QUESTIONNAIRE - PHQ9
2. FEELING DOWN, DEPRESSED, IRRITABLE, OR HOPELESS: NOT AT ALL
1. LITTLE INTEREST OR PLEASURE IN DOING THINGS: NOT AT ALL
SUM OF ALL RESPONSES TO PHQ9 QUESTIONS 1 AND 2: 0

## 2024-03-17 ASSESSMENT — ENCOUNTER SYMPTOMS
MYALGIAS: 1
ABDOMINAL PAIN: 1
COUGH: 1
SPUTUM PRODUCTION: 0
VOMITING: 1
NAUSEA: 1
DIZZINESS: 0
RESPIRATORY NEGATIVE: 1
SHORTNESS OF BREATH: 0
BRUISES/BLEEDS EASILY: 0
SEIZURES: 0
WEAKNESS: 1
EYES NEGATIVE: 1
DEPRESSION: 0
BACK PAIN: 0
INSOMNIA: 0
CONSTIPATION: 0
NAUSEA: 0
CHILLS: 1
HEADACHES: 0
COUGH: 0
HEARTBURN: 0
VOMITING: 0
CHILLS: 0
NERVOUS/ANXIOUS: 1
NECK PAIN: 0
HEMOPTYSIS: 0
LOSS OF CONSCIOUSNESS: 0
WEIGHT LOSS: 0
DIARRHEA: 0
PALPITATIONS: 0
POLYDIPSIA: 0
FEVER: 0
FOCAL WEAKNESS: 0

## 2024-03-17 ASSESSMENT — FIBROSIS 4 INDEX: FIB4 SCORE: 3.35

## 2024-03-17 ASSESSMENT — LIFESTYLE VARIABLES: SUBSTANCE_ABUSE: 1

## 2024-03-17 NOTE — CARE PLAN
The patient is Stable - Low risk of patient condition declining or worsening    Shift Goals  Clinical Goals: Stable BGL  Patient Goals: Rest and pain management  Family Goals: learn how to use insulin    Progress made toward(s) clinical / shift goals:    Problem: Pain - Standard  Goal: Alleviation of pain or a reduction in pain to the patient’s comfort goal  Outcome: Progressing  Note: Patient's ABD pain relieved by PRN medication.      Problem: Hemodynamics  Goal: Patient's hemodynamics, fluid balance and neurologic status will be stable or improve  Outcome: Progressing     Problem: Respiratory  Goal: Patient will achieve/maintain optimum respiratory ventilation and gas exchange  Outcome: Progressing  Flowsheets  Taken 3/17/2024 0000  O2 Delivery Device: Room air w/o2 available  Taken 3/16/2024 2000  Deep Breathe and Cough: Performs Correctly       Problem: Mechanical Ventilation  Goal: Safe management of artificial airway and ventilation  Outcome: Met  Goal: Successful weaning off mechanical ventilator, spontaneously maintains adequate gas exchange  Outcome: Met  Goal: Patient will be able to express needs and understand communication  Outcome: Met

## 2024-03-17 NOTE — PROGRESS NOTES
Infectious Disease Progress Note    Author: Ethan Berrios M.D. Date & Time of service: 3/17/2024  7:56 AM    Chief Complaint:  Follow-up for bacteremia    Interval History:  3/17 Tmax 98.8, creatinine 0.46, magnesium 1.9, cultures as below, HIV negative, white count down to 6.8.  Patient states she has a vaginal yeast infection    Labs Reviewed and Medications Reviewed.    Review of Systems:  Review of Systems   Constitutional:  Negative for chills and fever.   Skin:  Negative for itching and rash.       Hemodynamics:  No data recorded.  Monitored Temp: 37.1 °C (98.8 °F)  Pulse  Av.7  Min: 68  Max: 117   Blood Pressure: (!) 178/89       Physical Exam:  Physical Exam  Vitals and nursing note reviewed.   Constitutional:       General: She is not in acute distress.     Appearance: She is not ill-appearing.   Eyes:      Conjunctiva/sclera: Conjunctivae normal.   Cardiovascular:      Rate and Rhythm: Normal rate.   Pulmonary:      Effort: Pulmonary effort is normal. No respiratory distress.      Breath sounds: No stridor.   Abdominal:      General: There is no distension.   Musculoskeletal:         General: No swelling or tenderness.   Skin:     Findings: No erythema or rash.   Neurological:      General: No focal deficit present.      Mental Status: She is alert.   Psychiatric:         Mood and Affect: Mood normal.         Behavior: Behavior normal.         Meds:    Current Facility-Administered Medications:     magnesium sulfate    potassium chloride SA    insulin GLARGINE **AND** insulin lispro **AND** insulin lispro **AND** POC blood glucose manual result **AND** NOTIFY MD and PharmD **AND** Administer 20 grams of glucose (approximately 8 ounces of fruit juice) every 15 minutes PRN FSBG less than 70 mg/dL **AND** dextrose bolus    LR    omeprazole    ceFAZolin    LR    oxyCODONE immediate-release **OR** oxyCODONE immediate-release    acetaminophen    senna-docusate **AND** polyethylene glycol/lytes     labetalol    nystatin    Labs:  Recent Labs     03/14/24  1126 03/14/24  1515 03/15/24  0411   WBC 30.7* 30.3* 25.2*   RBC 4.84 4.56 4.20   HEMOGLOBIN 14.7 13.7 12.9   HEMATOCRIT 49.7* 45.4 39.3   .7* 99.6* 93.6   MCH 30.4 30.0 30.7   RDW 56.0* 53.5* 51.7*   PLATELETCT 367 358 203   MPV 10.0 10.0 9.7   NEUTSPOLYS 71.80 80.70*  --    LYMPHOCYTES 11.10* 5.30*  --    MONOCYTES 7.70 10.50  --    EOSINOPHILS 0.80 0.00  --    BASOPHILS 2.60* 0.90  --    RBCMORPHOLO Present Present  --      Recent Labs     03/16/24  0441 03/16/24  1016 03/17/24  0225   SODIUM 139 139 137   POTASSIUM 3.3* 3.3* 3.7   CHLORIDE 112 112 108   CO2 17* 18* 19*   GLUCOSE 91 85 198*   BUN 7* 5* 6*     Recent Labs     03/14/24  1126 03/14/24  1515 03/14/24  1740 03/15/24  0001 03/15/24  0411 03/16/24 0441 03/16/24  1016 03/17/24  0225   ALBUMIN 3.9 3.8  --  3.9  --   --   --   --    TBILIRUBIN 0.2 0.2  --  0.2  --   --   --   --    ALKPHOSPHAT 223* 201*  --  189*  --   --   --   --    TOTPROTEIN 6.5 5.8*  --  6.6  --   --   --   --    ALTSGPT 101* 98*  --  99*  --   --   --   --    ASTSGOT 199* 199*  --  144*  --   --   --   --    CREATININE 0.95 0.74   < > 1.00   < > 0.61 0.51 0.46*    < > = values in this interval not displayed.       Imaging:  EC-ECHOCARDIOGRAM COMPLETE W/O CONT    Result Date: 3/16/2024  Transthoracic Echo Report Echocardiography Laboratory CONCLUSIONS Normal left ventricular systolic function. The left ventricular ejection fraction is visually estimated to be 55- 60%. Normal diastolic function. The right ventricle is not well visualized, but appears grossly normal in size and systolic function. Unable to estimate right ventricular systolic pressure due to an inadequate tricuspid regurgitant jet. The cardiac valves are not well visualized. No evidence of hemodynamically significanbt valvular abnormality based on Doppler evaluation. Normal inferior vena cava size with blunted inspiratory collapse. ALESIA MONTOYA Exam Date:          2024                    15:07 Exam Location:     Inpatient Priority:          Routine Ordering Physician:        NIKA VIERA JR Referring Physician:       [id number] JR Sonographer:               Olamide Cabrera RDCS Age:    48     Gender:    F MRN:    [id number] :    1975 BSA:    1.97   Ht (in):    65     Wt (lb):    198 Exam Type:     Complete Indications:     Fever ICD Codes:       780.6 CPT Codes:       36557 BP:   132    /   65     HR:   85 Technical Quality:       Fair MEASUREMENTS  (Male / Female) Normal Values 2D ECHO LV Diastolic Diameter PLAX        4.1 cm                4.2 - 5.9 / 3.9 - 5.3 cm LV Systolic Diameter PLAX         3.5 cm                2.1 - 4.0 cm IVS Diastolic Thickness           1 cm                  LVPW Diastolic Thickness          0.8 cm                LVOT Diameter                     2.1 cm                Estimated LV Ejection Fraction    60 %                  LV Ejection Fraction MOD BP       60.2 %                >= 55  % LV Ejection Fraction MOD 4C       59.3 %                LV Ejection Fraction MOD 2C       59.3 %                LV Ejection Fraction 4C AL        58.6 %                LV Ejection Fraction 2C AL        58.8 %                LA Volume Index                   17.1 cm3/m2           16 - 28 cm3/m2 DOPPLER AV Peak Velocity                  1.2 m/s               AV Peak Gradient                  6.2 mmHg              AV Mean Gradient                  3 mmHg                LVOT Peak Velocity                1.1 m/s               AV Area Cont Eq vti               3.2 cm2               Mitral E Point Velocity           0.88 m/s              Mitral E to A Ratio               0.98                  MV Pressure Half Time             59 ms                 MV Area PHT                       3.7 cm2               MV Deceleration Time              202 ms                TV Peak E Velocity                0.37 m/s              LV E' Lateral Velocity             11.2 cm/s             Mitral E to LV E' Lateral Ratio   7.9                   LV E' Septal Velocity             9.4 cm/s              Mitral E to LV E' Septal Ratio    9.4                   * Indicates values subject to auto-interpretation LV EF:  60    % FINDINGS Left Ventricle Grossly normal left ventricular chamber size. Insufficient endocardial definition to accurately measure wall thickness. Normal left ventricular systolic function. The left ventricular ejection fraction is visually estimated to be 55-60%. Normal regional wall motion. Normal diastolic function. Right Ventricle The right ventricle is not well visualized, but appears grossly normal in size and systolic function. Right Atrium The right atrium is grossly normal in size. Normal inferior vena cava size with blunted inspiratory collapse. Left Atrium The left atrium is grossly normal in size. Mitral Valve The mitral valve is not well visualized. No significant stenosis or regurgitation by Doppler evaluation. Aortic Valve The aortic valve is not well visualized. No significant stenosis or regurgitation by Doppler evaluation. Tricuspid Valve The tricuspid valve is not well visualized. No significant stenosis or regurgitation by Doppler evaluation. Unable to estimate right ventricular systolic pressure due to an inadequate tricuspid regurgitant jet. Pulmonic Valve The pulmonic valve is not well visualized. No significant stenosis or regurgitation by Doppler evaluation. Pericardium No pericardial effusion. Aorta Normal aortic root for body surface area. The ascending aorta diameter is 2.9 cm. Mike Melendez MD (Electronically Signed) Final Date:     16 March 2024                 21:00    CT-ABDOMEN-PELVIS WITH    Result Date: 3/15/2024  3/15/2024 12:24 PM HISTORY/REASON FOR EXAM:  bacteremia, N/V abd pain. TECHNIQUE/EXAM DESCRIPTION:   CT scan of the abdomen and pelvis with contrast. Contrast-enhanced helical scanning was obtained from the diaphragmatic  domes through the pubic symphysis following the bolus administration of nonionic contrast without complication. 100 mL of Omnipaque 350 nonionic contrast was administered without complication. Low dose optimization technique was utilized for this CT exam including automated exposure control and adjustment of the mA and/or kV according to patient size. COMPARISON: CT chest abdomen and pelvis 9/7/2005 FINDINGS: Lower Chest: Dependent atelectasis opacities in the lower lobes. Liver: Hepatic fatty infiltration. Spleen: Unremarkable. Pancreas: Unremarkable. Gallbladder: The gallbladder has been resected. Biliary: Nondilated. Adrenal glands: Normal. Kidneys: Mild patchy decreased attenuation in the right kidney which is suspicious for pyelonephritis. Bowel: No obstruction or acute inflammation. Appendectomy as questioned, correlate clinically. Lymph nodes: No adenopathy. Vasculature: Unremarkable. Peritoneum: Unremarkable without ascites. Musculoskeletal: No acute or destructive process. Pelvis: No adenopathy or free fluid. Zheng catheter in the bladder. Urinary bladder is decompressed. Mass at the left aspect of uterus measures 3.2 x 2.5 cm. Statistically this most likely represents a uterine fibroid.     1.  Right-sided pyelonephritis is suspected. 2.  3.2 cm uterine mass which statistically most likely represents uterine fibroid. 3.  Fatty liver infiltration.    US-RUQ    Result Date: 3/14/2024  3/14/2024 5:18 PM HISTORY/REASON FOR EXAM:  Abnormal Labs Abdominal pain TECHNIQUE/EXAM DESCRIPTION AND NUMBER OF VIEWS:  Real-time sonography of the liver and biliary tree. COMPARISON: None FINDINGS: Exam somewhat difficult to perform due to bowel gas and patient's labored breathing. The liver is normal in contour with increased echogenicity. There is no evidence of solid mass lesion. The liver measures 18.38 cm. The gallbladder is surgically absent. The common duct measures 7.00 mm. The pancreas is obscured by bowel gas. The  "aorta is obscured by bowel gas. Intrahepatic IVC is not well visualized due to bowel gas. The portal vein is patent with hepatopetal flow. The MPV measures 0.96 cm cm. The right kidney measures 11.29 cm. There is no ascites.     1.  The liver is heterogeneously echogenic, most compatible with fatty infiltration, however other hepatocellular disease processes are in the differential diagnosis. 2.  Surgically absent gallbladder with minimal probable physiologic prominence of the common bile duct. 3.  Exam limited by bowel gas.    DX-CHEST-PORTABLE (1 VIEW)    Result Date: 3/14/2024  3/14/2024 12:59 PM HISTORY/REASON FOR EXAM:  Shortness of Breath. Hyperglycemia. TECHNIQUE/EXAM DESCRIPTION AND NUMBER OF VIEWS: Single portable view of the chest. COMPARISON: 9/7/2005 FINDINGS: The mediastinal and cardiac silhouette is unremarkable. The pulmonary vascularity is within normal limits. Lungs demonstrate no airspace process. Questionable small nodule of the right upper lobe versus bone island in rib. There is no significant pleural effusion. There is no visible pneumothorax. There are no acute bony abnormalities.     1.  There is no acute cardiopulmonary process.      Micro:  Results       Procedure Component Value Units Date/Time    BLOOD CULTURE [[id number]] Collected: 03/16/24 0045    Order Status: Completed Specimen: Blood from Peripheral Updated: 03/17/24 0741     Significant Indicator NEG     Source BLD     Site PERIPHERAL     Culture Result No Growth  Note: Blood cultures are incubated for 5 days and  are monitored continuously.Positive blood cultures  are called to the RN and reported as soon as  they are identified.      Narrative:      Per Hospital Policy: Only change Specimen Src: to \"Line\" if  specified by physician order.  Left Hand    BLOOD CULTURE [[id number]] Collected: 03/16/24 0141    Order Status: Completed Specimen: Blood from Peripheral Updated: 03/17/24 0741     Significant Indicator NEG     Source BLD     " "Site PERIPHERAL     Culture Result No Growth  Note: Blood cultures are incubated for 5 days and  are monitored continuously.Positive blood cultures  are called to the RN and reported as soon as  they are identified.      Narrative:      Per Hospital Policy: Only change Specimen Src: to \"Line\" if  specified by physician order.  Left Hand    BLOOD CULTURE x2 [[id number]] Collected: 03/14/24 1335    Order Status: Completed Specimen: Blood from Peripheral Updated: 03/15/24 1532     Significant Indicator NEG     Source BLD     Site PERIPHERAL     Culture Result No Growth  Note: Blood cultures are incubated for 5 days and  are monitored continuously.Positive blood cultures  are called to the RN and reported as soon as  they are identified.      Narrative:      Per Hospital Policy: Only change Specimen Src: to \"Line\" if  specified by physician order.  Right Hand    BLOOD CULTURE x2 [[id number]]  (Abnormal) Collected: 03/14/24 1305    Order Status: Completed Specimen: Blood from Peripheral Updated: 03/15/24 0742     Significant Indicator POS     Source BLD     Site PERIPHERAL     Culture Result Growth detected by Bactec instrument. 03/15/2024  02:07      Streptococcus agalactiae (Group B)  Identification performed by [employer]. Confirmatory testing  and susceptibilities to follow if indicated.      Narrative:      CALL  Henry  161 tel. [phone],  CALLED  161 tel. [phone] 03/15/2024, 02:10, RB PERF. RESULTS CALLED TO: RN  [id number]  Per Hospital Policy: Only change Specimen Src: to \"Line\" if  specified by physician order.  Left AC    URINALYSIS [[id number]]  (Abnormal) Collected: 03/14/24 1225    Order Status: Completed Specimen: Urine Updated: 03/14/24 1250     Color Yellow     Character Clear     Specific Gravity 1.022     Ph 5.0     Glucose >=1000 mg/dL      Ketones >=160 mg/dL      Protein 100 mg/dL      Bilirubin Negative     Urobilinogen, Urine 0.2     Nitrite Negative     Leukocyte Esterase Trace     Occult Blood Trace "     Micro Urine Req Microscopic            Assessment:  Tamera Jha is a 48 y.o. female patient with uncontrolled type 2 diabetes, admitted with DKA and found to have right-sided pyelonephritis with group B Strep bacteremia     Pertinent Diagnoses:  Group B Strep bacteremia  Right-sided pyonephritis  DKA  Uncontrolled type 2 diabetes  Transaminase elevation  Vaginal yeast infection, per patient report    Plan:  -Continue IV Ancef 2 g every 8 hours  -Follow repeat blood cultures x 2 from 3/16, pending, no growth till date  -Valves not well-visualized on TTE.  Will consider ILDA depending on clinical course, imaging and culture results  -HIV and hepatitis C antibodies negative  -MRI of the cervical and thoracic spine with contrast pending  -Single dose 150 mg of fluconazole ordered     Disposition: Unable to determine at this time  Need for PICC line: Unable to determine at this time     Plan was discussed with the primary team, Dr. Stevenson.  ID will follow-up with this illness poses threat to life

## 2024-03-17 NOTE — PROGRESS NOTES
4 Eyes Skin Assessment Completed by Luz YOUSSEF RN and Bandar BRUNER RN.    Head WDL  Ears WDL  Nose WDL  Mouth WDL  Neck WDL  Breast/Chest Redness  Shoulder Blades Redness and Blanching  Spine Redness and Blanching  (R) Arm/Elbow/Hand Blanching  (L) Arm/Elbow/Hand WDL  Abdomen Redness, excoriation and yeast. Under ABD fold   Groin Redness, Excoriation, and Rash  Scrotum/Coccyx/Buttocks Redness, Blanching, and Scab  (R) Leg WDL  (L) Leg WDL  (R) Heel/Foot/Toe Blanching and Boggy  (L) Heel/Foot/Toe Blanching and Boggy          Devices In Places ECG, Tele Box, Blood Pressure Cuff, Pulse Ox, and Zheng      Interventions In Place InterDry, Heel Mepilex, Sacral Mepilex, Waffle Overlay, Pillows, Elbow Mepilex, and Low Air Loss Mattress    Possible Skin Injury Yes    Pictures Uploaded Into Epic N/A  Wound Consult Placed Yes  RN Wound Prevention Protocol Ordered Yes

## 2024-03-17 NOTE — PROGRESS NOTES
[facility] Progress Note      Admit Date: 3/14/2024    Resident(s): Adán Box D.O.   Attending:  TORY FIELDS/ Dr. Stevenson    Patient ID:    Name:  Tamera Jha   YOB: 1975  Age:  48 y.o.  female   MRN:  [id number]    Hospital Course (carried forward and updated):  Tamera Jha is a 48 y.o. female who presented 3/14/2024 with diabetes mellitus type 2 (last A1c of 7.0 approximately 8 years ago).  She presents in DKA, with sepsis due to group B streptococcal bacteremia    3/15:Mild improvement in labs including bicarbonate, glucose.  Continued nausea, generalized fatigue/malaise, generalized pain.  Blood cultures 1/2 comes back for group B streptococcal bacteremia.  Right upper quadrant ultrasound significant for heterogenously echogenic liver concerning for pathogenic processes.  CT abdomen for further workup showing suspected right-sided pyelonephritis.  3.2 cm likely fibroid.  Findings of liver suggestive of fatty infiltration.  TTE ordered and pending  3/16: Infectious disease consulted; appreciate recommendations.  Ordering MRI cervical/thoracic for evaluation of neck pain.  Transitioning to sliding scale insulin.        Consultants:  Critical Care  Infectious disease,  Hospitalist    Interval Events:    No acute events overnight,  Stable to transition off ICU service.  TTE showing EF of 55 to 60%.  Unable to visualize valves.  Likely will need ILDA later in hospitalization.      NEURO: Nonfocal.  CARDIOVASC: No acute concerns.  Previous tachypnea, tachycardia have since resolved.  Mild worsening hypertension.    RESPIRATORY: Remains on room air  GI/NUTRITION: Minced and moist diet  RENAL/FLUID/LYTES: Mild maintenance fluids of LR at 75.    HEME/ONC: Resolution of significant leukocytosis noted previously.  Continued mild anemia.  Fecal occult positive the patient does not describe overt/yana blood loss.    INFECTIOUS D: Cefazolin for group B streptococcal bacteremia secondary to  pyelonephritis  ENDOCRINE: Diabetic management, prior DKA protocols.    Vitals Range last 24h:  Pulse:  [] 74  Resp:  [14-44] 19  BP: (130-180)/() (P) 168/104  SpO2:  [93 %-100 %] (P) 98 %      Intake/Output Summary (Last 24 hours) at 3/17/2024 1547  Last data filed at 3/17/2024 1445  Gross per 24 hour   Intake 2241.98 ml   Output 7450 ml   Net -5208.02 ml        Review of Systems   Constitutional:  Positive for chills. Negative for fever and weight loss.   Respiratory:  Positive for cough. Negative for hemoptysis, sputum production and shortness of breath.    Cardiovascular:  Negative for chest pain, palpitations and leg swelling.   Gastrointestinal:  Positive for abdominal pain, nausea and vomiting. Negative for constipation, diarrhea, heartburn and melena (Described stool is yellowish in appearance.).   Genitourinary:  Positive for dysuria and frequency.   Neurological:  Negative for seizures, loss of consciousness and headaches.   Psychiatric/Behavioral:  Positive for substance abuse. The patient does not have insomnia.        PHYSICAL EXAM:  Vitals:    03/17/24 0700 03/17/24 0800 03/17/24 1132 03/17/24 1500   BP: (!) 178/89 (!) 147/87 (!) 174/92 (!) (P) 168/104   Pulse: 68 64 74    Resp: 20 15 19    Temp:       TempSrc:       SpO2: 97% 96% 97% (P) 98%   Weight:       Height:        Body mass index is 33.46 kg/m².    O2 therapy: Pulse Oximetry: (P) 98 %, O2 Delivery Device: Room air w/o2 available    Date 03/17/24 0700 - 03/18/24 0659   Shift 5302-3527 0089-1319 6128-9954 24 Hour Total   INTAKE   P.O. 480   480     P.O. 480   480   Shift Total 480   480   OUTPUT   Urine 1350   1350     Number of Times Voided  1 x  1 x     Output (mL) ([REMOVED] Urethral Catheter Temperature probe 16 Fr. 03/17/24 1445) 1350   1350   Stool         Number of Times Stooled 1 x   1 x   Shift Total 1350   1350   NET -870   -870          Physical Exam  Vitals and nursing note reviewed.   Constitutional:       General: She  is not in acute distress.     Appearance: She is ill-appearing (appears improving). She is not diaphoretic.   HENT:      Head: Normocephalic and atraumatic.      Right Ear: External ear normal.      Left Ear: External ear normal.      Nose: Nose normal.   Eyes:      General: No scleral icterus.     Conjunctiva/sclera: Conjunctivae normal.   Cardiovascular:      Rate and Rhythm: Regular rhythm. Tachycardia present.      Pulses: Normal pulses.      Heart sounds: No murmur heard.  Pulmonary:      Effort: Pulmonary effort is normal. No respiratory distress.   Abdominal:      General: Abdomen is flat. There is no distension.      Palpations: There is no mass.      Tenderness: There is abdominal tenderness (mild). There is no guarding or rebound.      Hernia: No hernia is present.   Musculoskeletal:         General: No deformity.      Right lower leg: No edema.      Left lower leg: No edema.   Skin:     General: Skin is warm and dry.      Coloration: Skin is not jaundiced or pale.   Neurological:      General: No focal deficit present.      Mental Status: She is alert and oriented to person, place, and time. Mental status is at baseline.   Psychiatric:         Mood and Affect: Mood normal.         Thought Content: Thought content normal.         Recent Labs     03/15/24  0411   [id number] n/a     Recent Labs     03/16/24 0441 03/16/24  1016 03/17/24  0225   SODIUM 139 139 137   POTASSIUM 3.3* 3.3* 3.7   CHLORIDE 112 112 108   CO2 17* 18* 19*   BUN 7* 5* 6*   CREATININE 0.61 0.51 0.46*   MAGNESIUM 1.8 2.4 1.9   PHOSPHORUS 2.0* 1.5* 3.2   CALCIUM 7.6* 7.6* 7.9*     Recent Labs     03/15/24  0001 03/15/24  0411 03/16/24 0441 03/16/24  1016 03/17/24  0225   ALTSGPT 99*  --   --   --   --    ASTSGOT 144*  --   --   --   --    ALKPHOSPHAT 189*  --   --   --   --    TBILIRUBIN 0.2  --   --   --   --    GLUCOSE 164*   < > 91 85 198*    < > = values in this interval not displayed.     Recent Labs     03/15/24  0411 03/17/24  0840    RBC 4.20 3.95*   HEMOGLOBIN 12.9 11.9*   HEMATOCRIT 39.3 34.5*   PLATELETCT 203 143*     Recent Labs     03/15/24  0001 03/15/24  0411 03/17/24  0840   WBC  --  25.2* 6.8   NEUTSPOLYS  --   --  64.20   LYMPHOCYTES  --   --  20.90*   MONOCYTES  --   --  13.10   EOSINOPHILS  --   --  0.90   BASOPHILS  --   --  0.30   ASTSGOT 144*  --   --    ALTSGPT 99*  --   --    ALKPHOSPHAT 189*  --   --    TBILIRUBIN 0.2  --   --        Meds:   [START ON 3/18/2024] insulin GLARGINE  25 Units      And    insulin lispro  0.2 Units/kg/day      And    insulin lispro  2-9 Units      And    dextrose bolus  25 g      clotrimazole  10 mg      LR   75 mL/hr at 03/16/24 1317    omeprazole  20 mg      ceFAZolin  2 g      LR  500 mL      oxyCODONE immediate-release  5 mg      Or    oxyCODONE immediate-release  10 mg      acetaminophen  650 mg      senna-docusate  2 Tablet      And    polyethylene glycol/lytes  1 Packet      labetalol  10 mg      nystatin            Procedures:  N/a    Imaging:  EC-ECHOCARDIOGRAM COMPLETE W/O CONT   Final Result      CT-ABDOMEN-PELVIS WITH   Final Result      1.  Right-sided pyelonephritis is suspected.   2.  3.2 cm uterine mass which statistically most likely represents uterine fibroid.   3.  Fatty liver infiltration.      US-RUQ   Final Result      1.  The liver is heterogeneously echogenic, most compatible with fatty infiltration, however other hepatocellular disease processes are in the differential diagnosis.   2.  Surgically absent gallbladder with minimal probable physiologic prominence of the common bile duct.   3.  Exam limited by bowel gas.      DX-CHEST-PORTABLE (1 VIEW)   Final Result      1.  There is no acute cardiopulmonary process.      MR-CERVICAL SPINE-WITH & W/O    (Results Pending)   MR-THORACIC SPINE-WITH & W/O    (Results Pending)       ASSESSEMENT and PLAN:    * DKA, type 2, not at goal (HCC)- (present on admission)  Assessment & Plan  Bicarb of 3, anion gap in the 30s on admission,  beta hydroxybutyrate greater than 8 (unable to detect), point-of-care glucoses approximately 450.  A1c of 11 on admission.  Appears poorly controlled and likely patient will need insulin therapy upon discharge and likely indefinitely.  -Resolved.  Anion gap closed, bicarb of 19 (mild acidosis within margin of error)   -- Transition to glargine 25, lispro 6 plus sliding scale.    Vaginal yeast infection  Assessment & Plan  Reported per patient.  -Infectious disease added single dose of fluconazole.    Bacteremia due to group B Streptococcus- (present on admission)  Assessment & Plan  Blood cultures + March 14 1 out of 2 for repeat streptococcal bacteremia.  Susceptibilities in progress.  No growth to date blood cultures starting March 16  -Continuing Ancef  -TTE unable to visualize any valves.  Considering ILDA later hospital course pending clinical course and cultures.  -MRI cervical and thoracic spine: Ordered and pending at this time      Pyelonephritis- (present on admission)  Assessment & Plan  Suspected pyelonephritis present on CT scan on March 15.  Likely source of group B streptococcal bacteremia   -Continuing cefazolin.   -Blood culture showing group A streptococcal bacteremia.  1 out of 2 completed March 14.   -Infectious disease consulted.  Appreciate recommendations.   -- TTE unable to visualize valves.  Considering ILDA later hospital course based on clinical course and cultures.    Blood in stool- (present on admission)  Assessment & Plan  Inconsistent subjective history.  Possibly caused by DKA versus other GI pathology   -Occult stool added to labs.    Sepsis (HCC)- (present on admission)  Assessment & Plan  Significant leukocytosis consisting mostly of neutrophils, monocytes and basophils.  Urinalysis showing trace leukocyte Estrace but no overt bacteremia, 5-10 white blood cells, ketones and glucose consistent with DKA diagnosis.  However CT scan shows  likely right-sided pyelonephritis  Chest x-ray  showing no acute abnormalities.  Differential: Group B streptococcal bacteremia, pyelonephritis, diabetic ketoacidosis   -See group G streptococcal bacteremia, pyelonephritis problems   -Negative blood cultures from March 16th.  Blood cultures + March 14.    Transaminitis- (present on admission)  Assessment & Plan  Liver enzymes approximately 100-200. Likely secondary to hepatic steatosis, DKA causing significant nausea, vomiting, diarrhea.   -Diet minced and moist   --Hepatitis panel nonreactive  -CT scan March 15 showing fatty infiltration of the liver.  No HCC concerns.        DISPO: Ready for IMCU versus telemetry.    CODE STATUS: Full code    Quality Measures:  Feeding: Minced and moist  Analgesia: N/A  Sedation: N/A  Thromboprophylaxis: Holding due to possible GI bleed   Head of bed: >30 degrees  Ulcer prophylaxis: N/A  Glycemic control: Glargine 25, lispro 6 units plus sliding scale  Bowel care: bowel regimen senna docusate as needed  Indwelling lines: Peripheral line x3, Deescalation of antibiotics: Continuing cefazolin per ID.    Adán Box D.O.

## 2024-03-17 NOTE — PROGRESS NOTES
Pt arrived to T708 at 1410. Alert & oriented. Connected to tele monitoring. Marce ORONA'd per nursing protocol.     Updated on POC, oriented to room/call light. Educated to call for assistance with ambulation.

## 2024-03-18 PROBLEM — E11.10: Status: RESOLVED | Noted: 2024-03-14 | Resolved: 2024-03-18

## 2024-03-18 PROBLEM — I10 HTN (HYPERTENSION): Status: ACTIVE | Noted: 2024-03-18

## 2024-03-18 PROBLEM — D25.9 UTERINE FIBROID: Status: ACTIVE | Noted: 2024-03-18

## 2024-03-18 LAB
ALBUMIN SERPL BCP-MCNC: 3 G/DL (ref 3.2–4.9)
ALBUMIN/GLOB SERPL: 1.3 G/DL
ALP SERPL-CCNC: 274 U/L (ref 30–99)
ALT SERPL-CCNC: 44 U/L (ref 2–50)
ANION GAP SERPL CALC-SCNC: 11 MMOL/L (ref 7–16)
AST SERPL-CCNC: 172 U/L (ref 12–45)
BACTERIA BLD CULT: ABNORMAL
BACTERIA BLD CULT: ABNORMAL
BASOPHILS # BLD AUTO: 0.5 % (ref 0–1.8)
BASOPHILS # BLD: 0.03 K/UL (ref 0–0.12)
BILIRUB SERPL-MCNC: 0.6 MG/DL (ref 0.1–1.5)
BUN SERPL-MCNC: 5 MG/DL (ref 8–22)
CALCIUM ALBUM COR SERPL-MCNC: 9.4 MG/DL (ref 8.5–10.5)
CALCIUM SERPL-MCNC: 8.6 MG/DL (ref 8.5–10.5)
CHLORIDE SERPL-SCNC: 106 MMOL/L (ref 96–112)
CO2 SERPL-SCNC: 21 MMOL/L (ref 20–33)
CREAT SERPL-MCNC: 0.35 MG/DL (ref 0.5–1.4)
EOSINOPHIL # BLD AUTO: 0.18 K/UL (ref 0–0.51)
EOSINOPHIL NFR BLD: 3.1 % (ref 0–6.9)
ERYTHROCYTE [DISTWIDTH] IN BLOOD BY AUTOMATED COUNT: 47.8 FL (ref 35.9–50)
GFR SERPLBLD CREATININE-BSD FMLA CKD-EPI: 127 ML/MIN/1.73 M 2
GLOBULIN SER CALC-MCNC: 2.3 G/DL (ref 1.9–3.5)
GLUCOSE BLD STRIP.AUTO-MCNC: 163 MG/DL (ref 65–99)
GLUCOSE BLD STRIP.AUTO-MCNC: 163 MG/DL (ref 65–99)
GLUCOSE BLD STRIP.AUTO-MCNC: 178 MG/DL (ref 65–99)
GLUCOSE BLD STRIP.AUTO-MCNC: 197 MG/DL (ref 65–99)
GLUCOSE BLD STRIP.AUTO-MCNC: 207 MG/DL (ref 65–99)
GLUCOSE SERPL-MCNC: 182 MG/DL (ref 65–99)
HCT VFR BLD AUTO: 34 % (ref 37–47)
HGB BLD-MCNC: 11.9 G/DL (ref 12–16)
IMM GRANULOCYTES # BLD AUTO: 0.04 K/UL (ref 0–0.11)
IMM GRANULOCYTES NFR BLD AUTO: 0.7 % (ref 0–0.9)
LYMPHOCYTES # BLD AUTO: 1.45 K/UL (ref 1–4.8)
LYMPHOCYTES NFR BLD: 24.8 % (ref 22–41)
MAGNESIUM SERPL-MCNC: 1.8 MG/DL (ref 1.5–2.5)
MCH RBC QN AUTO: 30.6 PG (ref 27–33)
MCHC RBC AUTO-ENTMCNC: 35 G/DL (ref 32.2–35.5)
MCV RBC AUTO: 87.4 FL (ref 81.4–97.8)
MONOCYTES # BLD AUTO: 0.8 K/UL (ref 0–0.85)
MONOCYTES NFR BLD AUTO: 13.7 % (ref 0–13.4)
NEUTROPHILS # BLD AUTO: 3.34 K/UL (ref 1.82–7.42)
NEUTROPHILS NFR BLD: 57.2 % (ref 44–72)
NRBC # BLD AUTO: 0 K/UL
NRBC BLD-RTO: 0 /100 WBC (ref 0–0.2)
PHOSPHATE SERPL-MCNC: 3.1 MG/DL (ref 2.5–4.5)
PLATELET # BLD AUTO: 142 K/UL (ref 164–446)
PMV BLD AUTO: 9.7 FL (ref 9–12.9)
POTASSIUM SERPL-SCNC: 3.6 MMOL/L (ref 3.6–5.5)
PROT SERPL-MCNC: 5.3 G/DL (ref 6–8.2)
RBC # BLD AUTO: 3.89 M/UL (ref 4.2–5.4)
SIGNIFICANT IND 70042: ABNORMAL
SITE SITE: ABNORMAL
SODIUM SERPL-SCNC: 138 MMOL/L (ref 135–145)
SOURCE SOURCE: ABNORMAL
WBC # BLD AUTO: 5.8 K/UL (ref 4.8–10.8)

## 2024-03-18 PROCEDURE — 99233 SBSQ HOSP IP/OBS HIGH 50: CPT | Performed by: INTERNAL MEDICINE

## 2024-03-18 PROCEDURE — 99233 SBSQ HOSP IP/OBS HIGH 50: CPT | Performed by: HOSPITALIST

## 2024-03-18 PROCEDURE — 36415 COLL VENOUS BLD VENIPUNCTURE: CPT

## 2024-03-18 PROCEDURE — 82962 GLUCOSE BLOOD TEST: CPT

## 2024-03-18 PROCEDURE — A9270 NON-COVERED ITEM OR SERVICE: HCPCS

## 2024-03-18 PROCEDURE — A9270 NON-COVERED ITEM OR SERVICE: HCPCS | Performed by: INTERNAL MEDICINE

## 2024-03-18 PROCEDURE — 700102 HCHG RX REV CODE 250 W/ 637 OVERRIDE(OP)

## 2024-03-18 PROCEDURE — 700102 HCHG RX REV CODE 250 W/ 637 OVERRIDE(OP): Performed by: HOSPITALIST

## 2024-03-18 PROCEDURE — A9270 NON-COVERED ITEM OR SERVICE: HCPCS | Performed by: HOSPITALIST

## 2024-03-18 PROCEDURE — 83735 ASSAY OF MAGNESIUM: CPT

## 2024-03-18 PROCEDURE — 700102 HCHG RX REV CODE 250 W/ 637 OVERRIDE(OP): Performed by: INTERNAL MEDICINE

## 2024-03-18 PROCEDURE — 770020 HCHG ROOM/CARE - TELE (206)

## 2024-03-18 PROCEDURE — 700105 HCHG RX REV CODE 258: Performed by: INTERNAL MEDICINE

## 2024-03-18 PROCEDURE — 84100 ASSAY OF PHOSPHORUS: CPT

## 2024-03-18 PROCEDURE — 85025 COMPLETE CBC W/AUTO DIFF WBC: CPT

## 2024-03-18 PROCEDURE — 700111 HCHG RX REV CODE 636 W/ 250 OVERRIDE (IP): Performed by: INTERNAL MEDICINE

## 2024-03-18 PROCEDURE — 700101 HCHG RX REV CODE 250: Performed by: INTERNAL MEDICINE

## 2024-03-18 PROCEDURE — 80053 COMPREHEN METABOLIC PANEL: CPT

## 2024-03-18 RX ORDER — LOSARTAN POTASSIUM 50 MG/1
50 TABLET ORAL
Status: DISCONTINUED | OUTPATIENT
Start: 2024-03-18 | End: 2024-03-21 | Stop reason: HOSPADM

## 2024-03-18 RX ORDER — FLUCONAZOLE 100 MG/1
150 TABLET ORAL DAILY
Qty: 4 TABLET | Refills: 0 | Status: COMPLETED | OUTPATIENT
Start: 2024-03-18 | End: 2024-03-19

## 2024-03-18 RX ADMIN — OMEPRAZOLE 20 MG: 20 CAPSULE, DELAYED RELEASE ORAL at 05:29

## 2024-03-18 RX ADMIN — NYSTATIN: 100000 POWDER TOPICAL at 05:30

## 2024-03-18 RX ADMIN — ACETAMINOPHEN 650 MG: 325 TABLET, FILM COATED ORAL at 03:37

## 2024-03-18 RX ADMIN — SODIUM CHLORIDE, POTASSIUM CHLORIDE, SODIUM LACTATE AND CALCIUM CHLORIDE: 600; 310; 30; 20 INJECTION, SOLUTION INTRAVENOUS at 03:37

## 2024-03-18 RX ADMIN — ACETAMINOPHEN 650 MG: 325 TABLET, FILM COATED ORAL at 23:05

## 2024-03-18 RX ADMIN — ACETAMINOPHEN 650 MG: 325 TABLET, FILM COATED ORAL at 17:04

## 2024-03-18 RX ADMIN — INSULIN LISPRO 2 UNITS: 100 INJECTION, SOLUTION INTRAVENOUS; SUBCUTANEOUS at 08:34

## 2024-03-18 RX ADMIN — INSULIN LISPRO 6 UNITS: 100 INJECTION, SOLUTION INTRAVENOUS; SUBCUTANEOUS at 17:25

## 2024-03-18 RX ADMIN — ACETAMINOPHEN 650 MG: 325 TABLET, FILM COATED ORAL at 10:17

## 2024-03-18 RX ADMIN — INSULIN LISPRO 6 UNITS: 100 INJECTION, SOLUTION INTRAVENOUS; SUBCUTANEOUS at 08:25

## 2024-03-18 RX ADMIN — FLUCONAZOLE 150 MG: 100 TABLET ORAL at 15:11

## 2024-03-18 RX ADMIN — CLOTRIMAZOLE 10 MG: 10 LOZENGE ORAL; TOPICAL at 11:39

## 2024-03-18 RX ADMIN — OMEPRAZOLE 20 MG: 20 CAPSULE, DELAYED RELEASE ORAL at 17:04

## 2024-03-18 RX ADMIN — CLOTRIMAZOLE 10 MG: 10 LOZENGE ORAL; TOPICAL at 08:21

## 2024-03-18 RX ADMIN — INSULIN LISPRO 6 UNITS: 100 INJECTION, SOLUTION INTRAVENOUS; SUBCUTANEOUS at 08:24

## 2024-03-18 RX ADMIN — INSULIN LISPRO 2 UNITS: 100 INJECTION, SOLUTION INTRAVENOUS; SUBCUTANEOUS at 17:25

## 2024-03-18 RX ADMIN — OXYCODONE HYDROCHLORIDE 10 MG: 10 TABLET ORAL at 17:51

## 2024-03-18 RX ADMIN — OXYCODONE HYDROCHLORIDE 10 MG: 10 TABLET ORAL at 11:39

## 2024-03-18 RX ADMIN — CEFAZOLIN 2 G: 10 INJECTION, POWDER, FOR SOLUTION INTRAVENOUS at 05:30

## 2024-03-18 RX ADMIN — INSULIN LISPRO 3 UNITS: 100 INJECTION, SOLUTION INTRAVENOUS; SUBCUTANEOUS at 21:06

## 2024-03-18 RX ADMIN — CEFAZOLIN 2 G: 10 INJECTION, POWDER, FOR SOLUTION INTRAVENOUS at 17:03

## 2024-03-18 RX ADMIN — INSULIN LISPRO 6 UNITS: 100 INJECTION, SOLUTION INTRAVENOUS; SUBCUTANEOUS at 12:55

## 2024-03-18 RX ADMIN — INSULIN LISPRO 2 UNITS: 100 INJECTION, SOLUTION INTRAVENOUS; SUBCUTANEOUS at 12:57

## 2024-03-18 RX ADMIN — NYSTATIN: 100000 POWDER TOPICAL at 17:05

## 2024-03-18 RX ADMIN — LOSARTAN POTASSIUM 50 MG: 50 TABLET, FILM COATED ORAL at 15:12

## 2024-03-18 RX ADMIN — OXYCODONE HYDROCHLORIDE 10 MG: 10 TABLET ORAL at 05:30

## 2024-03-18 ASSESSMENT — ENCOUNTER SYMPTOMS
VOMITING: 0
FLANK PAIN: 1
ABDOMINAL PAIN: 0
COUGH: 0
NAUSEA: 1
SORE THROAT: 1
BACK PAIN: 1
WEAKNESS: 0
ABDOMINAL PAIN: 1
FEVER: 0
NERVOUS/ANXIOUS: 0
CONSTIPATION: 0
SHORTNESS OF BREATH: 0
NAUSEA: 0
MYALGIAS: 1
DIARRHEA: 0
SPUTUM PRODUCTION: 0

## 2024-03-18 ASSESSMENT — PAIN DESCRIPTION - PAIN TYPE
TYPE: ACUTE PAIN

## 2024-03-18 ASSESSMENT — PATIENT HEALTH QUESTIONNAIRE - PHQ9
SUM OF ALL RESPONSES TO PHQ9 QUESTIONS 1 AND 2: 0
1. LITTLE INTEREST OR PLEASURE IN DOING THINGS: NOT AT ALL
2. FEELING DOWN, DEPRESSED, IRRITABLE, OR HOPELESS: NOT AT ALL

## 2024-03-18 ASSESSMENT — FIBROSIS 4 INDEX: FIB4 SCORE: 8.58

## 2024-03-18 NOTE — CONSULTS
Hospital Medicine Consultation    Date of Service  3/17/2024    Referring Physician  Sterling Logan DO  Consulting Physician  Lamont Majano M.D.    Reason for Consultation  Hospital medicine consultation requested patient admitted with DKA    History of Presenting Illness  48 y.o. female who presented 3/14/2024 with with a history of diabetes, apparently previously only on metformin, inconsistent outpatient treatment, the patient presents with DKA and sepsis due to group B streptococcus bacteremia, the patient admitted to the ICU level of care, she had an ultrasound of the abdomen showing some fatty liver infiltration likely, CT of the abdomen consistent with right-sided pyelonephritis, uterine fibroid, ultrasound of the heart TTE without evidence of endocarditis, infectious disease consulted, in light of the patient's arthralgias, back and chest pain a MRI of the C and T-spine ordered, the patient concerned about thrush, difficulty swallowing, the patient is interested in further diabetic management and teaching and outpatient follow-up, she has been in transition to moved to [place] from [place], the transition apparently had some impact on her medical care.  The patient is currently afebrile, heart rate in the 70s and 80s, respiration unlabored, the patient is on room air, blood pressure elevated in the 170s to 160s over 90s and 100s  Laboratory data indicated white count of 6.8, hemoglobin 11.9, platelet count 143, chemistry with sodium 137, potassium 3.7, glucose 198, a hemoglobin A1c of 11.1, drug screen positive for Cannabis  The patient apparently had some dark stools, and was positive for occult blood,  Follow-up blood cultures currently negative from 3/16, 1 culture positive from 3/14  Today the patient also complained of a vaginal yeast infection, 150 mg fluconazole was ordered,  MRI scans are pending    Review of Systems  Review of Systems   Constitutional:  Positive for malaise/fatigue. Negative for  chills and fever.   HENT: Negative.     Eyes: Negative.    Respiratory: Negative.  Negative for cough.    Cardiovascular:  Positive for chest pain. Negative for palpitations.   Gastrointestinal:  Positive for abdominal pain. Negative for heartburn, nausea and vomiting.   Genitourinary: Negative.  Negative for dysuria and frequency.   Musculoskeletal:  Positive for myalgias. Negative for back pain and neck pain.   Skin: Negative.  Negative for itching and rash.   Neurological:  Positive for weakness. Negative for dizziness, focal weakness and headaches.   Endo/Heme/Allergies: Negative.  Negative for polydipsia. Does not bruise/bleed easily.   Psychiatric/Behavioral:  Negative for depression. The patient is nervous/anxious.        Past Medical History   has a past medical history of Diabetes (HCC), GDM, class B1 (3/31/2016), Hypertension, and Polyhydramnios in third trimester, antepartum complication (3/31/2016).    Surgical History   has a past surgical history that includes tonsillectomy; rhinoplasty; appendectomy; and cholecystectomy.    Family History  family history includes Diabetes in her mother; Heart Disease in her father; Hypertension in her mother; Other in her father.    Social History   reports that she has never smoked. She has never used smokeless tobacco. She reports that she does not drink alcohol and does not use drugs.    Medications  Prior to Admission Medications   Prescriptions Last Dose Informant Patient Reported? Taking?   Insulin Glargine-yfgn 100 UNIT/ML Solution Pen-injector 3/12/2024 at AM Patient, Patient's Home Pharmacy Yes No   Sig: Inject 15 Units under the skin every day.      Facility-Administered Medications: None       Allergies  Allergies   Allergen Reactions    Levaquin [Levofloxacin] Hives       Physical Exam  Pulse:  [] 74  Resp:  [14-44] 19  BP: (130-180)/() 168/104  SpO2:  [95 %-100 %] 98 %    Physical Exam  Vitals and nursing note reviewed.   Constitutional:        General: She is not in acute distress.     Appearance: She is well-developed. She is obese. She is not diaphoretic.   HENT:      Head: Normocephalic and atraumatic.      Nose: Nose normal.   Eyes:      Conjunctiva/sclera: Conjunctivae normal.      Pupils: Pupils are equal, round, and reactive to light.   Neck:      Thyroid: No thyromegaly.      Vascular: No JVD.   Cardiovascular:      Rate and Rhythm: Normal rate and regular rhythm.      Heart sounds: Normal heart sounds.      No friction rub. No gallop.   Pulmonary:      Effort: Pulmonary effort is normal.      Breath sounds: Normal breath sounds. No wheezing or rales.   Abdominal:      General: Bowel sounds are normal. There is no distension.      Palpations: Abdomen is soft. There is no mass.      Tenderness: There is no abdominal tenderness. There is no guarding or rebound.   Musculoskeletal:         General: No tenderness. Normal range of motion.      Cervical back: Normal range of motion and neck supple.   Lymphadenopathy:      Cervical: No cervical adenopathy.   Skin:     General: Skin is warm and dry.   Neurological:      Mental Status: She is alert and oriented to person, place, and time.      Cranial Nerves: No cranial nerve deficit.   Psychiatric:         Behavior: Behavior normal.         Fluids  Date 03/17/24 0700 - 03/18/24 0659   Shift 8616-1945 1765-2266 8198-9622 24 Hour Total   INTAKE   P.O. 480   480   Shift Total 480   480   OUTPUT   Urine 1350   1350   Shift Total 1350   1350   Weight (kg) 91.2 91.2 91.2 91.2       Laboratory  Recent Labs     03/15/24  0411 03/17/24  0840   WBC 25.2* 6.8   RBC 4.20 3.95*   HEMOGLOBIN 12.9 11.9*   HEMATOCRIT 39.3 34.5*   MCV 93.6 87.3   MCH 30.7 30.1   MCHC 32.8 34.5   RDW 51.7* 47.8   PLATELETCT 203 143*   MPV 9.7 9.5     Recent Labs     03/16/24  0441 03/16/24  1016 03/17/24  0225   SODIUM 139 139 137   POTASSIUM 3.3* 3.3* 3.7   CHLORIDE 112 112 108   CO2 17* 18* 19*   GLUCOSE 91 85 198*   BUN 7* 5* 6*    CREATININE 0.61 0.51 0.46*   CALCIUM 7.6* 7.6* 7.9*                     Imaging  EC-ECHOCARDIOGRAM COMPLETE W/O CONT   Final Result      CT-ABDOMEN-PELVIS WITH   Final Result      1.  Right-sided pyelonephritis is suspected.   2.  3.2 cm uterine mass which statistically most likely represents uterine fibroid.   3.  Fatty liver infiltration.      US-RUQ   Final Result      1.  The liver is heterogeneously echogenic, most compatible with fatty infiltration, however other hepatocellular disease processes are in the differential diagnosis.   2.  Surgically absent gallbladder with minimal probable physiologic prominence of the common bile duct.   3.  Exam limited by bowel gas.      DX-CHEST-PORTABLE (1 VIEW)   Final Result      1.  There is no acute cardiopulmonary process.      MR-CERVICAL SPINE-WITH & W/O    (Results Pending)   MR-THORACIC SPINE-WITH & W/O    (Results Pending)       Assessment/Plan  * DKA, type 2, not at goal (HCC)- (present on admission)  Assessment & Plan  Bicarb of 3, anion gap in the 30s on admission, beta hydroxybutyrate greater than 8 (unable to detect), point-of-care glucoses approximately 450.  A1c of 11 on admission.  Appears poorly controlled and likely patient will need insulin therapy upon discharge and likely indefinitely.  -Resolved.  Anion gap closed, bicarb of 19 (mild acidosis within margin of error)   -- Transition to glargine 25, lispro 6 plus sliding scale.    Vaginal yeast infection  Assessment & Plan  Reported per patient.  -Infectious disease added single dose of fluconazole.    Bacteremia due to group B Streptococcus- (present on admission)  Assessment & Plan  Blood cultures + March 14 1 out of 2 for repeat streptococcal bacteremia.  Susceptibilities in progress.  No growth to date blood cultures starting March 16  -Continuing Ancef  -TTE unable to visualize any valves.  Considering ILDA later hospital course pending clinical course and cultures.  -MRI cervical and thoracic  spine: Ordered and pending at this time      Pyelonephritis- (present on admission)  Assessment & Plan  Suspected pyelonephritis present on CT scan on March 15.  Likely source of group B streptococcal bacteremia   -Continuing cefazolin.   -Blood culture showing group A streptococcal bacteremia.  1 out of 2 completed March 14.   -Infectious disease consulted.  Appreciate recommendations.   -- TTE unable to visualize valves.  Considering ILDA later hospital course based on clinical course and cultures.    Blood in stool- (present on admission)  Assessment & Plan  Inconsistent subjective history.  Possibly caused by DKA versus other GI pathology   -Occult stool added to labs.    Sepsis (HCC)- (present on admission)  Assessment & Plan  Significant leukocytosis consisting mostly of neutrophils, monocytes and basophils.  Urinalysis showing trace leukocyte Estrace but no overt bacteremia, 5-10 white blood cells, ketones and glucose consistent with DKA diagnosis.  However CT scan shows  likely right-sided pyelonephritis  Chest x-ray showing no acute abnormalities.  Differential: Group B streptococcal bacteremia, pyelonephritis, diabetic ketoacidosis   -See group G streptococcal bacteremia, pyelonephritis problems   -Negative blood cultures from March 16th.  Blood cultures + March 14.    Transaminitis- (present on admission)  Assessment & Plan  Liver enzymes approximately 100-200. Likely secondary to hepatic steatosis, DKA causing significant nausea, vomiting, diarrhea.   -Diet minced and moist   --Hepatitis panel nonreactive  -CT scan March 15 showing fatty infiltration of the liver.  No HCC concerns.    Poor compliance- (present on admission)  Assessment & Plan  Patient with poorly controlled diabetes, concern of compliance    Plan  Continue with current antibiotic therapy aggressive with cefazolin 2 g every 8 hours, infectious disease following  Antifungal treatment including Mycelex, Diflucan  Diabetic regimen with  ongoing long and short acting insulin with AC scheduled short acting insulin  Maintain hydration, wean off IV fluids as tolerated  Will need diabetic education  Awaiting additional imaging including MRI of the spine to rule out nidus of infection  See orders  Patient is has a high medical complexity, complex decision making and is at high risk for complication, morbidity, and mortality.  My total time spent caring for the patient on the day of the encounter was 67 minutes.   This does not include time spent on separately billable procedures/tests.    Thank you for consulting with us, we will follow along closely while the patient is hospitalized      Please note that this dictation was created using voice recognition software. I have made every reasonable attempt to correct obvious errors, but I expect that there are errors of grammar and possibly context that I did not discover before finalizing the note.

## 2024-03-18 NOTE — CARE PLAN
Problem: Pain - Standard  Goal: Alleviation of pain or a reduction in pain to the patient’s comfort goal  Outcome: Progressing     Problem: Knowledge Deficit - Standard  Goal: Patient and family/care givers will demonstrate understanding of plan of care, disease process/condition, diagnostic tests and medications  Outcome: Progressing     Problem: Psychosocial  Goal: Patient's level of anxiety will decrease  Outcome: Progressing  Goal: Patient's ability to verbalize feelings about condition will improve  Outcome: Progressing  Goal: Patient's ability to re-evaluate and adapt role responsibilities will improve  Outcome: Progressing  Goal: Patient and family will demonstrate ability to cope with life altering diagnosis and/or procedure  Outcome: Progressing  Goal: Spiritual and cultural needs incorporated into hospitalization  Outcome: Progressing     Problem: Hemodynamics  Goal: Patient's hemodynamics, fluid balance and neurologic status will be stable or improve  Outcome: Progressing     Problem: Respiratory  Goal: Patient will achieve/maintain optimum respiratory ventilation and gas exchange  Outcome: Progressing     Problem: Risk for Aspiration  Goal: Patient's risk for aspiration will be absent or decrease  Outcome: Progressing     Problem: Urinary - Renal Perfusion  Goal: Ability to achieve and maintain adequate renal perfusion and functioning will improve  Outcome: Progressing     Problem: Nutrition  Goal: Patient's nutritional and fluid intake will be adequate or improve  Outcome: Progressing  Goal: Enteral nutrition will be maintained or improve  Outcome: Progressing  Goal: Enteral nutrition will be maintained or improve  Outcome: Progressing     Problem: Urinary Elimination  Goal: Establish and maintain regular urinary output  Outcome: Progressing     Problem: Bowel Elimination  Goal: Establish and maintain regular bowel function  Outcome: Progressing     Problem: Skin Integrity  Goal: Skin integrity is  maintained or improved  Outcome: Progressing     Problem: Fluid Volume  Goal: Fluid volume balance will be maintained  Outcome: Progressing     Problem: Physical Regulation  Goal: Diagnostic test results will improve  Outcome: Progressing  Goal: Signs and symptoms of infection will decrease  Outcome: Progressing   The patient is Stable - Low risk of patient condition declining or worsening    Shift Goals  Clinical Goals: pain control, rest  Patient Goals: pain control  Family Goals: learn how to use insulin    Progress made toward(s) clinical / shift goals:    Pt is eager to learn more about Diabetes. Pt is stating wanting to join gym and live a healthier lifestyle. Pt has been up ambulating. Pt started on BP med to help control BP    Patient is not progressing towards the following goals:

## 2024-03-18 NOTE — PROGRESS NOTES
Monitor summary:        Rhythm: SR  Rate: 67-85  Ectopy: no ectopy  Measurements: 0.14./0.06./0.40

## 2024-03-18 NOTE — HOSPITAL COURSE
48 y.o. female who presented 3/14/2024 with with a history of diabetes, apparently previously only on metformin, inconsistent outpatient treatment, the patient presents with DKA and sepsis due to group B streptococcus bacteremia, the patient admitted to the ICU level of care, she had an ultrasound of the abdomen showing some fatty liver infiltration likely, CT of the abdomen consistent with right-sided pyelonephritis, uterine fibroid, ultrasound of the heart TTE without evidence of endocarditis, infectious disease consulted, in light of the patient's arthralgias, back and chest pain a MRI of the C and T-spine ordered, the patient concerned about thrush, difficulty swallowing, the patient is interested in further diabetic management and teaching and outpatient follow-up, she has been in transition to moved to [place] from [place], the transition apparently had some impact on her medical care.

## 2024-03-18 NOTE — ASSESSMENT & PLAN NOTE
Patient with poorly controlled diabetes, concern of compliance  Complicated by recent move will need to establish with PCP discussed with case management

## 2024-03-18 NOTE — CARE PLAN
The patient is Stable - Low risk of patient condition declining or worsening    Shift Goals  Clinical Goals: pain control, rest  Patient Goals: pain control  Family Goals: learn how to use insulin    Progress made toward(s) clinical / shift goals:      Problem: Pain - Standard  Goal: Alleviation of pain or a reduction in pain to the patient’s comfort goal  Outcome: Progressing     Problem: Knowledge Deficit - Standard  Goal: Patient and family/care givers will demonstrate understanding of plan of care, disease process/condition, diagnostic tests and medications  Outcome: Progressing     Problem: Psychosocial  Goal: Patient's level of anxiety will decrease  Outcome: Progressing       Patient is not progressing towards the following goals:

## 2024-03-18 NOTE — PROGRESS NOTES
4 Eyes Skin Assessment Completed by LISE Merritt and LISE Dietz.    Head WDL  Ears WDL  Nose WDL  Mouth WDL  Neck WDL  Breast/Chest Redness under breasts  Shoulder Blades Redness and Blanching  Spine Redness and Blanching  (R) Arm/Elbow/Hand Blanching  (L) Arm/Elbow/Hand Blanching and Bruising  Abdomen Redness and Rash, yeast under pannus  Groin Redness, Excoriation, and Rash, yeast  Scrotum/Coccyx/Buttocks Redness, Blanching, and Scab  (R) Leg WDL  (L) Leg WDL  (R) Heel/Foot/Toe Blanching and Boggy  (L) Heel/Foot/Toe Blanching and Boggy    Devices In Places Tele Box and Pulse Ox      Interventions In Place Pillows and Pressure Redistribution Mattress    Possible Skin Injury No    Pictures Uploaded Into Epic N/A-Completed  Wound Consult Placed Yes-Completed 3/16  RN Wound Prevention Protocol Ordered Yes

## 2024-03-18 NOTE — PROGRESS NOTES
Hospital Medicine Daily Progress Note    Date of Service  3/18/2024    Chief Complaint  Tamera Jha is a 48 y.o. female admitted 3/14/2024 with pyelonephritis    Hospital Course  48 y.o. female who presented 3/14/2024 with with a history of diabetes, apparently previously only on metformin, inconsistent outpatient treatment, the patient presents with DKA and sepsis due to group B streptococcus bacteremia, the patient admitted to the ICU level of care, she had an ultrasound of the abdomen showing some fatty liver infiltration likely, CT of the abdomen consistent with right-sided pyelonephritis, uterine fibroid, ultrasound of the heart TTE without evidence of endocarditis, infectious disease consulted, in light of the patient's arthralgias, back and chest pain a MRI of the C and T-spine ordered, the patient concerned about thrush, difficulty swallowing, the patient is interested in further diabetic management and teaching and outpatient follow-up, she has been in transition to moved to [place] from [place], the transition apparently had some impact on her medical care.     Interval Problem Update    Patient transferred from ICU to telemetry floor  Reviewed hospitalization records  Reviewed MRI of C and thoracic spine with chronic T6 compression fracture no evidence of osteomyelitis or epidural abscess  Blood cultures from 314 growing group B strep  Blood cultures from 3/16/2024 remain negative  I reviewed chemistry panel electrolytes stable BUN 5 creatinine 0.3 alk phos 274  Reviewed CBC WBC 5.8 hemoglobin 11.9 platelets 142  I reviewed CBGs 163-197.  Hemoglobin A1c 11.1  Blood pressure is elevated I ordered losartan    Patient with multiple questions which were answered  Total time spent reviewing medical records imaging and lab results examining patient and updating her on plan of care and coordinating with nursing staff case management 55 minutes      I have discussed this patient's plan of care and discharge  plan at IDT rounds today with Case Management, Nursing, Nursing leadership, and other members of the IDT team.    Consultants/Specialty  infectious disease    Code Status  Full Code    Disposition  The patient is not medically cleared for discharge to home or a post-acute facility.  Anticipate discharge to: home with close outpatient follow-up    I have placed the appropriate orders for post-discharge needs.    Review of Systems  Review of Systems   Constitutional:  Negative for fever.   Respiratory:  Negative for shortness of breath.    Cardiovascular:  Negative for chest pain.   Gastrointestinal:  Negative for abdominal pain, nausea and vomiting.   Genitourinary:  Negative for dysuria and hematuria.   Musculoskeletal:  Positive for back pain.   All other systems reviewed and are negative.       Physical Exam  Temp:  [36.3 °C (97.3 °F)-36.9 °C (98.4 °F)] 36.3 °C (97.3 °F)  Pulse:  [] 78  Resp:  [16-20] 17  BP: (137-168)/() 160/100  SpO2:  [95 %-98 %] 98 %    Physical Exam  Vitals and nursing note reviewed.   Constitutional:       General: She is not in acute distress.  HENT:      Head: Normocephalic and atraumatic.      Nose: Nose normal. No rhinorrhea.      Mouth/Throat:      Pharynx: No oropharyngeal exudate or posterior oropharyngeal erythema.   Eyes:      General:         Right eye: No discharge.         Left eye: No discharge.   Cardiovascular:      Rate and Rhythm: Normal rate and regular rhythm.      Heart sounds: Normal heart sounds. No murmur heard.     No friction rub. No gallop.   Pulmonary:      Effort: Pulmonary effort is normal. No respiratory distress.      Breath sounds: Normal breath sounds. No stridor. No wheezing, rhonchi or rales.   Chest:      Chest wall: No tenderness.   Abdominal:      General: Bowel sounds are normal. There is no distension.      Palpations: Abdomen is soft. There is no mass.      Tenderness: There is abdominal tenderness. There is right CVA tenderness. There is  no rebound.   Musculoskeletal:         General: No swelling or tenderness.      Cervical back: Neck supple. No rigidity.   Skin:     General: Skin is warm and dry.      Coloration: Skin is not cyanotic.      Nails: There is no clubbing.   Neurological:      General: No focal deficit present.      Mental Status: She is alert and oriented to person, place, and time.      Cranial Nerves: No cranial nerve deficit.      Motor: No weakness.   Psychiatric:         Mood and Affect: Mood normal.         Behavior: Behavior normal.         Fluids    Intake/Output Summary (Last 24 hours) at 3/18/2024 1320  Last data filed at 3/18/2024 0400  Gross per 24 hour   Intake 480 ml   Output 200 ml   Net 280 ml       Laboratory  Recent Labs     03/17/24  0840 03/18/24  0258   WBC 6.8 5.8   RBC 3.95* 3.89*   HEMOGLOBIN 11.9* 11.9*   HEMATOCRIT 34.5* 34.0*   MCV 87.3 87.4   MCH 30.1 30.6   MCHC 34.5 35.0   RDW 47.8 47.8   PLATELETCT 143* 142*   MPV 9.5 9.7     Recent Labs     03/16/24  1016 03/17/24  0225 03/18/24  0258   SODIUM 139 137 138   POTASSIUM 3.3* 3.7 3.6   CHLORIDE 112 108 106   CO2 18* 19* 21   GLUCOSE 85 198* 182*   BUN 5* 6* 5*   CREATININE 0.51 0.46* 0.35*   CALCIUM 7.6* 7.9* 8.6                   Imaging  MR-THORACIC SPINE-WITH & W/O   Final Result         Remote superior end plate compression deformity at T6 with 50% loss of height.      There is no significant canal stenosis or foraminal narrowing of the thoracic spine. No abnormal enhancement is seen. Spinal cord signal is normal.      MR-CERVICAL SPINE-WITH & W/O   Final Result         1.  Grade 1 anterolisthesis of C7 over T1. Small central protrusion of the sella causes mild canal narrowing without cord impingement.      2.  No abnormal spinal cord signal is identified. There is no abnormal enhancement in the cervical spine to suggest an ongoing infectious process.      EC-ECHOCARDIOGRAM COMPLETE W/O CONT   Final Result      CT-ABDOMEN-PELVIS WITH   Final Result       1.  Right-sided pyelonephritis is suspected.   2.  3.2 cm uterine mass which statistically most likely represents uterine fibroid.   3.  Fatty liver infiltration.      US-RUQ   Final Result      1.  The liver is heterogeneously echogenic, most compatible with fatty infiltration, however other hepatocellular disease processes are in the differential diagnosis.   2.  Surgically absent gallbladder with minimal probable physiologic prominence of the common bile duct.   3.  Exam limited by bowel gas.      DX-CHEST-PORTABLE (1 VIEW)   Final Result      1.  There is no acute cardiopulmonary process.           Assessment/Plan  * DKA, type 2, not at goal (HCC)- (present on admission)  Assessment & Plan  Bicarb of 3, anion gap in the 30s on admission, beta hydroxybutyrate greater than 8 (unable to detect), point-of-care glucoses approximately 450.      DKA resolved  Given hemoglobin A1c greater than 10 patient will need insulin to get her diabetes under control  Continue Lantus Premeal Humalog and sliding scale insulin  Consider resuming metformin at discharge  Patient will need testing supplies and insulin prescriptions on discharge and close outpatient follow-up  Diabetic teaching    HTN (hypertension)  Assessment & Plan  Start losartan and monitor blood pressure    Uterine fibroid  Assessment & Plan  Noted on CT outpatient follow-up with GYN    Vaginal yeast infection- (present on admission)  Assessment & Plan  Received oral fluconazole    Bacteremia due to group B Streptococcus- (present on admission)  Assessment & Plan  Blood cultures + March 14 1 out of 2 for repeat streptococcal bacteremia.  .  No growth to date blood cultures starting March 16  -Continuing Ancef  -TTE unable to visualize any valves.  Considering ILDA later hospital course pending clinical course and cultures.  -MRI cervical and thoracic spine: Negative for osteomyelitis/discitis      Pyelonephritis- (present on admission)  Assessment &  Plan  Suspected pyelonephritis present on CT scan on March 15.  Likely source of group B streptococcal bacteremia   -Continuing cefazolin.   -ID following  No valvular abnormalities on surface echo but poor visualization's of valves  Follow-up on repeat blood cultures from 3/16/2024    Blood in stool- (present on admission)  Assessment & Plan  Hemoccult positive    Hemoglobin overall stable  Reviewed with patient that she will need outpatient GI evaluation with upper and lower endoscopies  Monitor hemoglobin    Sepsis (HCC)- (present on admission)  Assessment & Plan  Source pyelonephritis  Blood cultures group B strep  ID following continue IV cefazolin and follow-up on repeat blood cultures from 3/16/2024    Transaminitis- (present on admission)  Assessment & Plan  Improved  Reviewed CT abdomen from 3/15/2024 revealing fatty liver infiltration    Diabetic control monitor LFTs and outpatient follow-up    Poor compliance- (present on admission)  Assessment & Plan  Patient with poorly controlled diabetes, concern of compliance  Complicated by recent move will need to establish with PCP discussed with case management         VTE prophylaxis: Given positive Hemoccults  SCDs/TEDs      I have performed a physical exam and reviewed and updated ROS and Plan today (3/18/2024). In review of yesterday's note (3/17/2024), there are no changes except as documented above.

## 2024-03-18 NOTE — PROGRESS NOTES
Infectious Disease Progress Note    Author: Mary Newman M.D. Date & Time of service: 3/18/2024  12:56 PM    Chief Complaint:  Follow-up for bacteremia     Interval History:  3/17 Tmax 98.8, creatinine 0.46, magnesium 1.9, cultures as below, HIV negative, white count down to 6.8.  Patient states she has a vaginal yeast infection    Review of Systems:  Review of Systems   HENT:  Positive for sore throat.    Respiratory:  Negative for cough, sputum production and shortness of breath.    Gastrointestinal:  Positive for abdominal pain and nausea. Negative for constipation, diarrhea and vomiting.   Genitourinary:  Positive for flank pain.   Musculoskeletal:  Positive for myalgias.   Neurological:  Negative for weakness.   Psychiatric/Behavioral:  The patient is not nervous/anxious.        Hemodynamics:  Temp (24hrs), Av.6 °C (97.8 °F), Min:36.3 °C (97.3 °F), Max:36.9 °C (98.4 °F)  Temperature: 36.3 °C (97.3 °F)  Pulse  Av.5  Min: 64  Max: 121   Blood Pressure: (!) 160/100 (Rn notified, BP taken twice. Pt in pain )       Physical Exam:  Physical Exam  Cardiovascular:      Rate and Rhythm: Normal rate and regular rhythm.      Heart sounds: Normal heart sounds.   Pulmonary:      Effort: Pulmonary effort is normal.      Breath sounds: Normal breath sounds.   Abdominal:      General: There is no distension.      Tenderness: There is abdominal tenderness. There is right CVA tenderness and left CVA tenderness.   Musculoskeletal:         General: Normal range of motion.   Skin:     General: Skin is warm and dry.   Neurological:      General: No focal deficit present.      Mental Status: She is oriented to person, place, and time.   Psychiatric:         Mood and Affect: Mood normal.         Behavior: Behavior normal.         Meds:    Current Facility-Administered Medications:     insulin GLARGINE **AND** insulin lispro **AND** insulin lispro **AND** POC blood glucose manual result **AND** NOTIFY MD and PharmD  **AND** Administer 20 grams of glucose (approximately 8 ounces of fruit juice) every 15 minutes PRN FSBG less than 70 mg/dL **AND** dextrose bolus    clotrimazole    LR    omeprazole    ceFAZolin    LR    oxyCODONE immediate-release **OR** oxyCODONE immediate-release    acetaminophen    senna-docusate **AND** polyethylene glycol/lytes    labetalol    nystatin    Labs:  Recent Labs     03/17/24  0840 03/18/24  0258   WBC 6.8 5.8   RBC 3.95* 3.89*   HEMOGLOBIN 11.9* 11.9*   HEMATOCRIT 34.5* 34.0*   MCV 87.3 87.4   MCH 30.1 30.6   RDW 47.8 47.8   PLATELETCT 143* 142*   MPV 9.5 9.7   NEUTSPOLYS 64.20 57.20   LYMPHOCYTES 20.90* 24.80   MONOCYTES 13.10 13.70*   EOSINOPHILS 0.90 3.10   BASOPHILS 0.30 0.50     Recent Labs     03/16/24  1016 03/17/24  0225 03/18/24  0258   SODIUM 139 137 138   POTASSIUM 3.3* 3.7 3.6   CHLORIDE 112 108 106   CO2 18* 19* 21   GLUCOSE 85 198* 182*   BUN 5* 6* 5*     Recent Labs     03/16/24  1016 03/17/24  0225 03/18/24  0258   ALBUMIN  --   --  3.0*   TBILIRUBIN  --   --  0.6   ALKPHOSPHAT  --   --  274*   TOTPROTEIN  --   --  5.3*   ALTSGPT  --   --  44   ASTSGOT  --   --  172*   CREATININE 0.51 0.46* 0.35*       Imaging:  MR-THORACIC SPINE-WITH & W/O    Result Date: 3/18/2024  3/17/2024 3:50 PM HISTORY/REASON FOR EXAM:  bacteremia, back pain TECHNIQUE/EXAM DESCRIPTION: MRI of the thoracic spine without and with contrast. The study was performed on a [employer] 1.5 Cris MRI scanner. T1 sagittal, T2 fast spin-echo sagittal, and T2 axial images were obtained of the thoracic spine. T1 post-contrast fat suppressed sagittal images were obtained. Optional T1 post-contrast axial images may be obtained. 20 mL ProHance contrast was administered intravenously. COMPARISON:  9/7/2005, CT thoracic spine, CT abdomen pelvis dated 3/15/2024 FINDINGS: Thoracic vertebral body alignment is within normal limits. There is a superior endplate contour deformity at T6 with approximately 50% loss of height without  associated bone marrow edema suggesting a chronic compression fracture. Spinal cord signal intensity in the thoracic spine is within normal limits. The conus terminates at the L1 level comment just outside the field-of-view but seen on the  images. Type II bone marrow changes are noted of the adjacent endplates of the T11-T12 level. Bone marrow signal intensity is otherwise within normal limits. Postcontrast images through the thoracic spine do not demonstrate any abnormal enhancement.     Remote superior end plate compression deformity at T6 with 50% loss of height. There is no significant canal stenosis or foraminal narrowing of the thoracic spine. No abnormal enhancement is seen. Spinal cord signal is normal.    MR-CERVICAL SPINE-WITH & W/O    Result Date: 3/18/2024  3/17/2024 3:50 PM HISTORY/REASON FOR EXAM:  bacteremia, neck pain TECHNIQUE/EXAM DESCRIPTION: MRI of the cervical spine without and with contrast. The study was performed on a [employer] 1.5 Cris MRI scanner. T1 sagittal, T2 fast spin-echo sagittal, T1 postcontrast fat-suppressed sagittal, and gradient-echo axial images were obtained of the cervical spine. 20 mL ProHance contrast were administered  intravenously. COMPARISON:  CT cervical spine dated 9/7/2005 FINDINGS:  There is grade 1 anterolisthesis of C7 over T1. Spinal cord signal intensity is within normal limits. The included portion of the posterior fossa is within normal limits. The cervical cord has a normal caliber course and signal intensity. There is no evidence of abnormal enhancement in the cervical cord or spine. No area of abnormal enhancement is seen. Inflammatory mucosal thickening is noted in the bilateral maxillary sinuses. Findings specific to each level are described below: C2-3: Normal C3-4:  Normal C4-5:  Normal C5-6: Normal C6-7: Normal C7-T1: Grade 1 anterolisthesis of C7 over T1 with posterior central disc protrusion noted there is mild encroachment upon the spinal  canal without cord impingement. Postcontrast images through the cervical spine do not demonstrate any abnormal enhancement.     1.  Grade 1 anterolisthesis of C7 over T1. Small central protrusion of the sella causes mild canal narrowing without cord impingement. 2.  No abnormal spinal cord signal is identified. There is no abnormal enhancement in the cervical spine to suggest an ongoing infectious process.    EC-ECHOCARDIOGRAM COMPLETE W/O CONT    Result Date: 3/16/2024  Transthoracic Echo Report Echocardiography Laboratory CONCLUSIONS Normal left ventricular systolic function. The left ventricular ejection fraction is visually estimated to be 55- 60%. Normal diastolic function. The right ventricle is not well visualized, but appears grossly normal in size and systolic function. Unable to estimate right ventricular systolic pressure due to an inadequate tricuspid regurgitant jet. The cardiac valves are not well visualized. No evidence of hemodynamically significanbt valvular abnormality based on Doppler evaluation. Normal inferior vena cava size with blunted inspiratory collapse. ALESIA MONTOYA Exam Date:         2024                    15:07 Exam Location:     Inpatient Priority:          Routine Ordering Physician:        NIKA VIERA JR Referring Physician:       [id number] JR Sonographer:               Olamide Cabrera RDCS Age:    48     Gender:    F MRN:    [id number] :    1975 BSA:    1.97   Ht (in):    65     Wt (lb):    198 Exam Type:     Complete Indications:     Fever ICD Codes:       780.6 CPT Codes:       19934 BP:   132    /   65     HR:   85 Technical Quality:       Fair MEASUREMENTS  (Male / Female) Normal Values 2D ECHO LV Diastolic Diameter PLAX        4.1 cm                4.2 - 5.9 / 3.9 - 5.3 cm LV Systolic Diameter PLAX         3.5 cm                2.1 - 4.0 cm IVS Diastolic Thickness           1 cm                  LVPW Diastolic Thickness          0.8 cm                LVOT  Diameter                     2.1 cm                Estimated LV Ejection Fraction    60 %                  LV Ejection Fraction MOD BP       60.2 %                >= 55  % LV Ejection Fraction MOD 4C       59.3 %                LV Ejection Fraction MOD 2C       59.3 %                LV Ejection Fraction 4C AL        58.6 %                LV Ejection Fraction 2C AL        58.8 %                LA Volume Index                   17.1 cm3/m2           16 - 28 cm3/m2 DOPPLER AV Peak Velocity                  1.2 m/s               AV Peak Gradient                  6.2 mmHg              AV Mean Gradient                  3 mmHg                LVOT Peak Velocity                1.1 m/s               AV Area Cont Eq vti               3.2 cm2               Mitral E Point Velocity           0.88 m/s              Mitral E to A Ratio               0.98                  MV Pressure Half Time             59 ms                 MV Area PHT                       3.7 cm2               MV Deceleration Time              202 ms                TV Peak E Velocity                0.37 m/s              LV E' Lateral Velocity            11.2 cm/s             Mitral E to LV E' Lateral Ratio   7.9                   LV E' Septal Velocity             9.4 cm/s              Mitral E to LV E' Septal Ratio    9.4                   * Indicates values subject to auto-interpretation LV EF:  60    % FINDINGS Left Ventricle Grossly normal left ventricular chamber size. Insufficient endocardial definition to accurately measure wall thickness. Normal left ventricular systolic function. The left ventricular ejection fraction is visually estimated to be 55-60%. Normal regional wall motion. Normal diastolic function. Right Ventricle The right ventricle is not well visualized, but appears grossly normal in size and systolic function. Right Atrium The right atrium is grossly normal in size. Normal inferior vena cava size with blunted inspiratory collapse. Left  Atrium The left atrium is grossly normal in size. Mitral Valve The mitral valve is not well visualized. No significant stenosis or regurgitation by Doppler evaluation. Aortic Valve The aortic valve is not well visualized. No significant stenosis or regurgitation by Doppler evaluation. Tricuspid Valve The tricuspid valve is not well visualized. No significant stenosis or regurgitation by Doppler evaluation. Unable to estimate right ventricular systolic pressure due to an inadequate tricuspid regurgitant jet. Pulmonic Valve The pulmonic valve is not well visualized. No significant stenosis or regurgitation by Doppler evaluation. Pericardium No pericardial effusion. Aorta Normal aortic root for body surface area. The ascending aorta diameter is 2.9 cm. Mike Melendez MD (Electronically Signed) Final Date:     16 March 2024                 21:00    CT-ABDOMEN-PELVIS WITH    Result Date: 3/15/2024  3/15/2024 12:24 PM HISTORY/REASON FOR EXAM:  bacteremia, N/V abd pain. TECHNIQUE/EXAM DESCRIPTION:   CT scan of the abdomen and pelvis with contrast. Contrast-enhanced helical scanning was obtained from the diaphragmatic domes through the pubic symphysis following the bolus administration of nonionic contrast without complication. 100 mL of Omnipaque 350 nonionic contrast was administered without complication. Low dose optimization technique was utilized for this CT exam including automated exposure control and adjustment of the mA and/or kV according to patient size. COMPARISON: CT chest abdomen and pelvis 9/7/2005 FINDINGS: Lower Chest: Dependent atelectasis opacities in the lower lobes. Liver: Hepatic fatty infiltration. Spleen: Unremarkable. Pancreas: Unremarkable. Gallbladder: The gallbladder has been resected. Biliary: Nondilated. Adrenal glands: Normal. Kidneys: Mild patchy decreased attenuation in the right kidney which is suspicious for pyelonephritis. Bowel: No obstruction or acute inflammation. Appendectomy as  questioned, correlate clinically. Lymph nodes: No adenopathy. Vasculature: Unremarkable. Peritoneum: Unremarkable without ascites. Musculoskeletal: No acute or destructive process. Pelvis: No adenopathy or free fluid. Zheng catheter in the bladder. Urinary bladder is decompressed. Mass at the left aspect of uterus measures 3.2 x 2.5 cm. Statistically this most likely represents a uterine fibroid.     1.  Right-sided pyelonephritis is suspected. 2.  3.2 cm uterine mass which statistically most likely represents uterine fibroid. 3.  Fatty liver infiltration.    US-RUQ    Result Date: 3/14/2024  3/14/2024 5:18 PM HISTORY/REASON FOR EXAM:  Abnormal Labs Abdominal pain TECHNIQUE/EXAM DESCRIPTION AND NUMBER OF VIEWS:  Real-time sonography of the liver and biliary tree. COMPARISON: None FINDINGS: Exam somewhat difficult to perform due to bowel gas and patient's labored breathing. The liver is normal in contour with increased echogenicity. There is no evidence of solid mass lesion. The liver measures 18.38 cm. The gallbladder is surgically absent. The common duct measures 7.00 mm. The pancreas is obscured by bowel gas. The aorta is obscured by bowel gas. Intrahepatic IVC is not well visualized due to bowel gas. The portal vein is patent with hepatopetal flow. The MPV measures 0.96 cm cm. The right kidney measures 11.29 cm. There is no ascites.     1.  The liver is heterogeneously echogenic, most compatible with fatty infiltration, however other hepatocellular disease processes are in the differential diagnosis. 2.  Surgically absent gallbladder with minimal probable physiologic prominence of the common bile duct. 3.  Exam limited by bowel gas.    DX-CHEST-PORTABLE (1 VIEW)    Result Date: 3/14/2024  3/14/2024 12:59 PM HISTORY/REASON FOR EXAM:  Shortness of Breath. Hyperglycemia. TECHNIQUE/EXAM DESCRIPTION AND NUMBER OF VIEWS: Single portable view of the chest. COMPARISON: 9/7/2005 FINDINGS: The mediastinal and cardiac  "silhouette is unremarkable. The pulmonary vascularity is within normal limits. Lungs demonstrate no airspace process. Questionable small nodule of the right upper lobe versus bone island in rib. There is no significant pleural effusion. There is no visible pneumothorax. There are no acute bony abnormalities.     1.  There is no acute cardiopulmonary process.      Micro:  Results       Procedure Component Value Units Date/Time    BLOOD CULTURE x2 [[id number]]  (Abnormal)  (Susceptibility) Collected: 03/14/24 1305    Order Status: Completed Specimen: Blood from Peripheral Updated: 03/18/24 0838     Significant Indicator POS     Source BLD     Site PERIPHERAL     Culture Result Growth detected by Bactec instrument. 03/15/2024  02:07      Streptococcus agalactiae (Group B)    Narrative:      CALL  Henry  161 tel. [phone],  CALLED  161 tel. [phone] 03/15/2024, 02:10, RB PERF. RESULTS CALLED TO: RN  [id number]  Per Hospital Policy: Only change Specimen Src: to \"Line\" if  specified by physician order.  Left AC    Susceptibility       Streptococcus agalactiae (group b) (1)       Antibiotic Interpretation Microscan   Method Status    Daptomycin Sensitive <=0.5 mcg/mL KHAI Final    Clindamycin Resistant - mcg/mL KHAI Final    Penicillin Sensitive <=0.03 mcg/mL KHAI Final                       BLOOD CULTURE [[id number]] Collected: 03/16/24 0045    Order Status: Completed Specimen: Blood from Peripheral Updated: 03/17/24 0741     Significant Indicator NEG     Source BLD     Site PERIPHERAL     Culture Result No Growth  Note: Blood cultures are incubated for 5 days and  are monitored continuously.Positive blood cultures  are called to the RN and reported as soon as  they are identified.      Narrative:      Per Hospital Policy: Only change Specimen Src: to \"Line\" if  specified by physician order.  Left Hand    BLOOD CULTURE [[id number]] Collected: 03/16/24 0141    Order Status: Completed Specimen: Blood from Peripheral Updated: " "03/17/24 0741     Significant Indicator NEG     Source BLD     Site PERIPHERAL     Culture Result No Growth  Note: Blood cultures are incubated for 5 days and  are monitored continuously.Positive blood cultures  are called to the RN and reported as soon as  they are identified.      Narrative:      Per Hospital Policy: Only change Specimen Src: to \"Line\" if  specified by physician order.  Left Hand    BLOOD CULTURE x2 [[id number]] Collected: 03/14/24 1335    Order Status: Completed Specimen: Blood from Peripheral Updated: 03/15/24 1532     Significant Indicator NEG     Source BLD     Site PERIPHERAL     Culture Result No Growth  Note: Blood cultures are incubated for 5 days and  are monitored continuously.Positive blood cultures  are called to the RN and reported as soon as  they are identified.      Narrative:      Per Hospital Policy: Only change Specimen Src: to \"Line\" if  specified by physician order.  Right Hand    URINALYSIS [[id number]]  (Abnormal) Collected: 03/14/24 1225    Order Status: Completed Specimen: Urine Updated: 03/14/24 1250     Color Yellow     Character Clear     Specific Gravity 1.022     Ph 5.0     Glucose >=1000 mg/dL      Ketones >=160 mg/dL      Protein 100 mg/dL      Bilirubin Negative     Urobilinogen, Urine 0.2     Nitrite Negative     Leukocyte Esterase Trace     Occult Blood Trace     Micro Urine Req Microscopic            Assessment:  Active Hospital Problems    Diagnosis     *DKA, type 2, not at goal ([facility]) [E11.10]     Vaginal yeast infection [B37.31]     Pyelonephritis [N12]     Bacteremia due to group B Streptococcus [R78.81, B95.1]     Transaminitis [R74.01]     Acidosis due to type 2 diabetes mellitus ([facility]) [E11.10]     Sepsis ([facility]) [A41.9]     Blood in stool [K92.1]     Poor compliance [Z91.199]      Interval 24 hours:      AF, O2 RA  Labs reviewed to assess for clinical status, drug toxicity and organ function  Imaging personally reviewed both images and report.   Micro " reviewed    Patient with abdominal pain and some ongoing nausea, bilateral flank pain and back pain.  She is also reporting some mouth and throat soreness.  Continue antibiotics as below.    Assessment:  Tamera Jha is a 48 y.o. female patient with uncontrolled type 2 diabetes, admitted with DKA and found to have right-sided pyelonephritis with group B Strep bacteremia     Pertinent Diagnoses:  Group B Strep bacteremia  -Blood cultures on 3/14 positive for Streptococcus agalactiae  -Blood culture on 3/16 is no growth to date  Right-sided pyonephritis  DKA  Uncontrolled type 2 diabetes  Transaminase elevation  Vaginal yeast infection, per patient report, complaining of ongoing discharge and itching  Complaining of sore mouth and throat, possible thrush but not obvious on exam     Plan:  -Continue IV Ancef 2 g every 8 hours  -Stopped clotrimazole, will give 3-day course of fluconazole 150 mg daily  -Continue nystatin powder  -Follow repeat blood cultures x 2 from 3/16, pending, no growth till date  -Valves not well-visualized on TTE.  Will consider ILDA depending on clinical course, imaging and culture results  -HIV and hepatitis C antibodies negative  -MRI of the cervical and thoracic spine with contrast with no obvious infectious findings  -Single dose 150 mg of fluconazole given on 3/17     Disposition: Unable to determine at this time, anticipate will have oral options for discharge  Need for PICC line: Unable to determine at this time, unlikely     Plan was discussed with the primary team. ID will follow-up with this illness poses threat to life.

## 2024-03-19 LAB
ALBUMIN SERPL BCP-MCNC: 3.7 G/DL (ref 3.2–4.9)
ALBUMIN/GLOB SERPL: 1.4 G/DL
ALP SERPL-CCNC: 624 U/L (ref 30–99)
ALT SERPL-CCNC: 110 U/L (ref 2–50)
ANION GAP SERPL CALC-SCNC: 12 MMOL/L (ref 7–16)
AST SERPL-CCNC: 407 U/L (ref 12–45)
BACTERIA BLD CULT: NORMAL
BILIRUB SERPL-MCNC: 0.4 MG/DL (ref 0.1–1.5)
BUN SERPL-MCNC: 4 MG/DL (ref 8–22)
CALCIUM ALBUM COR SERPL-MCNC: 9.6 MG/DL (ref 8.5–10.5)
CALCIUM SERPL-MCNC: 9.4 MG/DL (ref 8.5–10.5)
CHLORIDE SERPL-SCNC: 102 MMOL/L (ref 96–112)
CO2 SERPL-SCNC: 26 MMOL/L (ref 20–33)
CREAT SERPL-MCNC: 0.39 MG/DL (ref 0.5–1.4)
ERYTHROCYTE [DISTWIDTH] IN BLOOD BY AUTOMATED COUNT: 47.5 FL (ref 35.9–50)
GFR SERPLBLD CREATININE-BSD FMLA CKD-EPI: 123 ML/MIN/1.73 M 2
GLOBULIN SER CALC-MCNC: 2.6 G/DL (ref 1.9–3.5)
GLUCOSE BLD STRIP.AUTO-MCNC: 157 MG/DL (ref 65–99)
GLUCOSE BLD STRIP.AUTO-MCNC: 185 MG/DL (ref 65–99)
GLUCOSE BLD STRIP.AUTO-MCNC: 187 MG/DL (ref 65–99)
GLUCOSE BLD STRIP.AUTO-MCNC: 220 MG/DL (ref 65–99)
GLUCOSE SERPL-MCNC: 185 MG/DL (ref 65–99)
HCT VFR BLD AUTO: 38.9 % (ref 37–47)
HGB BLD-MCNC: 13.2 G/DL (ref 12–16)
MAGNESIUM SERPL-MCNC: 1.8 MG/DL (ref 1.5–2.5)
MCH RBC QN AUTO: 30 PG (ref 27–33)
MCHC RBC AUTO-ENTMCNC: 33.9 G/DL (ref 32.2–35.5)
MCV RBC AUTO: 88.4 FL (ref 81.4–97.8)
PHOSPHATE SERPL-MCNC: 4.1 MG/DL (ref 2.5–4.5)
PLATELET # BLD AUTO: 216 K/UL (ref 164–446)
PMV BLD AUTO: 9.4 FL (ref 9–12.9)
POTASSIUM SERPL-SCNC: 4.4 MMOL/L (ref 3.6–5.5)
PROT SERPL-MCNC: 6.3 G/DL (ref 6–8.2)
RBC # BLD AUTO: 4.4 M/UL (ref 4.2–5.4)
SIGNIFICANT IND 70042: NORMAL
SITE SITE: NORMAL
SODIUM SERPL-SCNC: 140 MMOL/L (ref 135–145)
SOURCE SOURCE: NORMAL
WBC # BLD AUTO: 7.2 K/UL (ref 4.8–10.8)

## 2024-03-19 PROCEDURE — 700101 HCHG RX REV CODE 250: Performed by: INTERNAL MEDICINE

## 2024-03-19 PROCEDURE — 99233 SBSQ HOSP IP/OBS HIGH 50: CPT | Performed by: INTERNAL MEDICINE

## 2024-03-19 PROCEDURE — A9270 NON-COVERED ITEM OR SERVICE: HCPCS | Performed by: INTERNAL MEDICINE

## 2024-03-19 PROCEDURE — A9270 NON-COVERED ITEM OR SERVICE: HCPCS | Performed by: HOSPITALIST

## 2024-03-19 PROCEDURE — 85027 COMPLETE CBC AUTOMATED: CPT

## 2024-03-19 PROCEDURE — 700102 HCHG RX REV CODE 250 W/ 637 OVERRIDE(OP)

## 2024-03-19 PROCEDURE — A9270 NON-COVERED ITEM OR SERVICE: HCPCS

## 2024-03-19 PROCEDURE — 80053 COMPREHEN METABOLIC PANEL: CPT

## 2024-03-19 PROCEDURE — 84100 ASSAY OF PHOSPHORUS: CPT

## 2024-03-19 PROCEDURE — 700102 HCHG RX REV CODE 250 W/ 637 OVERRIDE(OP): Performed by: INTERNAL MEDICINE

## 2024-03-19 PROCEDURE — 700102 HCHG RX REV CODE 250 W/ 637 OVERRIDE(OP): Performed by: HOSPITALIST

## 2024-03-19 PROCEDURE — 82962 GLUCOSE BLOOD TEST: CPT | Mod: 91

## 2024-03-19 PROCEDURE — 770020 HCHG ROOM/CARE - TELE (206)

## 2024-03-19 PROCEDURE — 36415 COLL VENOUS BLD VENIPUNCTURE: CPT

## 2024-03-19 PROCEDURE — 83735 ASSAY OF MAGNESIUM: CPT

## 2024-03-19 PROCEDURE — 700111 HCHG RX REV CODE 636 W/ 250 OVERRIDE (IP): Performed by: INTERNAL MEDICINE

## 2024-03-19 RX ORDER — DIPHENHYDRAMINE HCL 25 MG
25 TABLET ORAL ONCE
Status: COMPLETED | OUTPATIENT
Start: 2024-03-19 | End: 2024-03-19

## 2024-03-19 RX ORDER — INSULIN LISPRO 100 [IU]/ML
2-9 INJECTION, SOLUTION INTRAVENOUS; SUBCUTANEOUS
Status: DISCONTINUED | OUTPATIENT
Start: 2024-03-19 | End: 2024-03-21 | Stop reason: HOSPADM

## 2024-03-19 RX ORDER — INSULIN LISPRO 100 [IU]/ML
0.2 INJECTION, SOLUTION INTRAVENOUS; SUBCUTANEOUS
Status: DISCONTINUED | OUTPATIENT
Start: 2024-03-19 | End: 2024-03-21 | Stop reason: HOSPADM

## 2024-03-19 RX ORDER — AMLODIPINE BESYLATE 5 MG/1
5 TABLET ORAL
Status: DISCONTINUED | OUTPATIENT
Start: 2024-03-19 | End: 2024-03-21 | Stop reason: HOSPADM

## 2024-03-19 RX ADMIN — INSULIN LISPRO 2 UNITS: 100 INJECTION, SOLUTION INTRAVENOUS; SUBCUTANEOUS at 17:22

## 2024-03-19 RX ADMIN — INSULIN LISPRO 2 UNITS: 100 INJECTION, SOLUTION INTRAVENOUS; SUBCUTANEOUS at 20:56

## 2024-03-19 RX ADMIN — AMLODIPINE BESYLATE 5 MG: 5 TABLET ORAL at 09:15

## 2024-03-19 RX ADMIN — INSULIN LISPRO 6 UNITS: 100 INJECTION, SOLUTION INTRAVENOUS; SUBCUTANEOUS at 08:00

## 2024-03-19 RX ADMIN — INSULIN LISPRO 6 UNITS: 100 INJECTION, SOLUTION INTRAVENOUS; SUBCUTANEOUS at 17:22

## 2024-03-19 RX ADMIN — OXYCODONE HYDROCHLORIDE 10 MG: 10 TABLET ORAL at 05:32

## 2024-03-19 RX ADMIN — INSULIN LISPRO 2 UNITS: 100 INJECTION, SOLUTION INTRAVENOUS; SUBCUTANEOUS at 07:59

## 2024-03-19 RX ADMIN — CEFAZOLIN 2 G: 10 INJECTION, POWDER, FOR SOLUTION INTRAVENOUS at 00:06

## 2024-03-19 RX ADMIN — OMEPRAZOLE 20 MG: 20 CAPSULE, DELAYED RELEASE ORAL at 05:31

## 2024-03-19 RX ADMIN — INSULIN LISPRO 6 UNITS: 100 INJECTION, SOLUTION INTRAVENOUS; SUBCUTANEOUS at 11:59

## 2024-03-19 RX ADMIN — ACETAMINOPHEN 650 MG: 325 TABLET, FILM COATED ORAL at 11:55

## 2024-03-19 RX ADMIN — ACETAMINOPHEN 650 MG: 325 TABLET, FILM COATED ORAL at 18:20

## 2024-03-19 RX ADMIN — DIPHENHYDRAMINE HYDROCHLORIDE 25 MG: 25 TABLET ORAL at 13:26

## 2024-03-19 RX ADMIN — FLUCONAZOLE 150 MG: 100 TABLET ORAL at 05:30

## 2024-03-19 RX ADMIN — OXYCODONE HYDROCHLORIDE 10 MG: 10 TABLET ORAL at 18:17

## 2024-03-19 RX ADMIN — OMEPRAZOLE 20 MG: 20 CAPSULE, DELAYED RELEASE ORAL at 17:21

## 2024-03-19 RX ADMIN — OXYCODONE HYDROCHLORIDE 10 MG: 10 TABLET ORAL at 00:05

## 2024-03-19 RX ADMIN — INSULIN LISPRO 3 UNITS: 100 INJECTION, SOLUTION INTRAVENOUS; SUBCUTANEOUS at 11:56

## 2024-03-19 RX ADMIN — ACETAMINOPHEN 650 MG: 325 TABLET, FILM COATED ORAL at 05:31

## 2024-03-19 RX ADMIN — OXYCODONE HYDROCHLORIDE 10 MG: 10 TABLET ORAL at 11:55

## 2024-03-19 RX ADMIN — NYSTATIN: 100000 POWDER TOPICAL at 05:33

## 2024-03-19 RX ADMIN — LOSARTAN POTASSIUM 50 MG: 50 TABLET, FILM COATED ORAL at 05:30

## 2024-03-19 RX ADMIN — CEFAZOLIN 2 G: 10 INJECTION, POWDER, FOR SOLUTION INTRAVENOUS at 14:14

## 2024-03-19 RX ADMIN — CEFAZOLIN 2 G: 10 INJECTION, POWDER, FOR SOLUTION INTRAVENOUS at 09:16

## 2024-03-19 RX ADMIN — CEFAZOLIN 2 G: 10 INJECTION, POWDER, FOR SOLUTION INTRAVENOUS at 21:29

## 2024-03-19 ASSESSMENT — ENCOUNTER SYMPTOMS
FEVER: 0
MYALGIAS: 1
ABDOMINAL PAIN: 0
CONSTIPATION: 0
COUGH: 0
SHORTNESS OF BREATH: 0
FLANK PAIN: 1
ABDOMINAL PAIN: 1
NAUSEA: 0
DIARRHEA: 0
BACK PAIN: 1
NERVOUS/ANXIOUS: 0
VOMITING: 0
NAUSEA: 1

## 2024-03-19 ASSESSMENT — PAIN DESCRIPTION - PAIN TYPE
TYPE: ACUTE PAIN

## 2024-03-19 ASSESSMENT — COGNITIVE AND FUNCTIONAL STATUS - GENERAL
DAILY ACTIVITIY SCORE: 24
SUGGESTED CMS G CODE MODIFIER MOBILITY: CH
SUGGESTED CMS G CODE MODIFIER DAILY ACTIVITY: CH
MOBILITY SCORE: 24

## 2024-03-19 ASSESSMENT — FIBROSIS 4 INDEX: FIB4 SCORE: 8.58

## 2024-03-19 NOTE — CARE PLAN
The patient is Stable - Low risk of patient condition declining or worsening    Shift Goals  Clinical Goals: pain control, diabetes education  Patient Goals: pain control  Family Goals: feel better    Progress made toward(s) clinical / shift goals:        Problem: Knowledge Deficit - Standard  Goal: Patient and family/care givers will demonstrate understanding of plan of care, disease process/condition, diagnostic tests and medications  3/19/2024 0053 by Shaila Garay R.N.  Outcome: Progressing  3/19/2024 0053 by Shaila Garay R.N.  Outcome: Progressing       Patient is not progressing towards the following goals:

## 2024-03-19 NOTE — PROGRESS NOTES
Hospital Medicine Daily Progress Note    Date of Service  3/19/2024    Chief Complaint  Tamera Jha is a 48 y.o. female admitted 3/14/2024 with pyelonephritis    Hospital Course  48 y.o. female who presented 3/14/2024 with with a history of diabetes, apparently previously only on metformin, inconsistent outpatient treatment, the patient presents with DKA and sepsis due to group B streptococcus bacteremia, the patient admitted to the ICU level of care, she had an ultrasound of the abdomen showing some fatty liver infiltration likely, CT of the abdomen consistent with right-sided pyelonephritis, uterine fibroid, ultrasound of the heart TTE without evidence of endocarditis, infectious disease consulted, in light of the patient's arthralgias, back and chest pain a MRI of the C and T-spine ordered, the patient concerned about thrush, difficulty swallowing, the patient is interested in further diabetic management and teaching and outpatient follow-up, she has been in transition to moved to [place] from [place], the transition apparently had some impact on her medical care.     Interval Problem Update    Patient transferred from ICU to telemetry floor  Reviewed hospitalization records  Reviewed MRI of C and thoracic spine with chronic T6 compression fracture no evidence of osteomyelitis or epidural abscess  Blood cultures from 314 growing group B strep  Blood cultures from 3/16/2024 remain negative  I reviewed chemistry panel electrolytes stable BUN 5 creatinine 0.3 alk phos 274  Reviewed CBC WBC 5.8 hemoglobin 11.9 platelets 142  I reviewed CBGs 163-197.  Hemoglobin A1c 11.1  Blood pressure is elevated I ordered losartan  3/19: Patient seen and examined, hs some nausea no vomiting, afebrile.  ID following regarding her bacteremia and case discussed appreciate rec.  DM cont on insulin diabetic education     I have discussed this patient's plan of care and discharge plan at IDT rounds today with Case Management,  Nursing, Nursing leadership, and other members of the IDT team.    Consultants/Specialty  infectious disease    Code Status  Full Code    Disposition  The patient is not medically cleared for discharge to home or a post-acute facility.      I have placed the appropriate orders for post-discharge needs.    Review of Systems  Review of Systems   Constitutional:  Negative for fever.   Respiratory:  Negative for shortness of breath.    Cardiovascular:  Negative for chest pain.   Gastrointestinal:  Negative for abdominal pain, nausea and vomiting.   Genitourinary:  Negative for dysuria and hematuria.   Musculoskeletal:  Positive for back pain.   All other systems reviewed and are negative.       Physical Exam  Temp:  [36.3 °C (97.3 °F)-36.7 °C (98.1 °F)] 36.6 °C (97.9 °F)  Pulse:  [67-79] 79  Resp:  [16-18] 16  BP: (144-168)/() 144/87  SpO2:  [93 %-97 %] 97 %    Physical Exam  Vitals and nursing note reviewed.   Constitutional:       General: She is not in acute distress.  HENT:      Head: Normocephalic and atraumatic.      Nose: Nose normal. No rhinorrhea.      Mouth/Throat:      Pharynx: No oropharyngeal exudate or posterior oropharyngeal erythema.   Eyes:      General:         Right eye: No discharge.         Left eye: No discharge.   Cardiovascular:      Rate and Rhythm: Normal rate and regular rhythm.      Heart sounds: Normal heart sounds. No murmur heard.     No friction rub. No gallop.   Pulmonary:      Effort: Pulmonary effort is normal. No respiratory distress.      Breath sounds: Normal breath sounds. No stridor. No wheezing, rhonchi or rales.   Chest:      Chest wall: No tenderness.   Abdominal:      General: Bowel sounds are normal. There is no distension.      Palpations: Abdomen is soft. There is no mass.      Tenderness: There is abdominal tenderness. There is right CVA tenderness. There is no rebound.   Musculoskeletal:         General: No swelling or tenderness.      Cervical back: Neck supple. No  rigidity.   Skin:     General: Skin is warm and dry.      Coloration: Skin is not cyanotic.      Nails: There is no clubbing.   Neurological:      General: No focal deficit present.      Mental Status: She is alert and oriented to person, place, and time.      Cranial Nerves: No cranial nerve deficit.      Motor: No weakness.   Psychiatric:         Mood and Affect: Mood normal.         Behavior: Behavior normal.         Fluids    Intake/Output Summary (Last 24 hours) at 3/19/2024 1313  Last data filed at 3/19/2024 0900  Gross per 24 hour   Intake 1080 ml   Output --   Net 1080 ml       Laboratory  Recent Labs     03/17/24  0840 03/18/24  0258 03/19/24  0442   WBC 6.8 5.8 7.2   RBC 3.95* 3.89* 4.40   HEMOGLOBIN 11.9* 11.9* 13.2   HEMATOCRIT 34.5* 34.0* 38.9   MCV 87.3 87.4 88.4   MCH 30.1 30.6 30.0   MCHC 34.5 35.0 33.9   RDW 47.8 47.8 47.5   PLATELETCT 143* 142* 216   MPV 9.5 9.7 9.4     Recent Labs     03/17/24  0225 03/18/24  0258 03/19/24  0442   SODIUM 137 138 140   POTASSIUM 3.7 3.6 4.4   CHLORIDE 108 106 102   CO2 19* 21 26   GLUCOSE 198* 182* 185*   BUN 6* 5* 4*   CREATININE 0.46* 0.35* 0.39*   CALCIUM 7.9* 8.6 9.4                   Imaging  MR-THORACIC SPINE-WITH & W/O   Final Result         Remote superior end plate compression deformity at T6 with 50% loss of height.      There is no significant canal stenosis or foraminal narrowing of the thoracic spine. No abnormal enhancement is seen. Spinal cord signal is normal.      MR-CERVICAL SPINE-WITH & W/O   Final Result         1.  Grade 1 anterolisthesis of C7 over T1. Small central protrusion of the sella causes mild canal narrowing without cord impingement.      2.  No abnormal spinal cord signal is identified. There is no abnormal enhancement in the cervical spine to suggest an ongoing infectious process.      EC-ECHOCARDIOGRAM COMPLETE W/O CONT   Final Result      CT-ABDOMEN-PELVIS WITH   Final Result      1.  Right-sided pyelonephritis is suspected.   2.   3.2 cm uterine mass which statistically most likely represents uterine fibroid.   3.  Fatty liver infiltration.      US-RUQ   Final Result      1.  The liver is heterogeneously echogenic, most compatible with fatty infiltration, however other hepatocellular disease processes are in the differential diagnosis.   2.  Surgically absent gallbladder with minimal probable physiologic prominence of the common bile duct.   3.  Exam limited by bowel gas.      DX-CHEST-PORTABLE (1 VIEW)   Final Result      1.  There is no acute cardiopulmonary process.           Assessment/Plan  * DKA, type 2, not at goal (HCC)- (present on admission)  Assessment & Plan  Bicarb of 3, anion gap in the 30s on admission, beta hydroxybutyrate greater than 8 (unable to detect), point-of-care glucoses approximately 450.      DKA resolved  Given hemoglobin A1c greater than 10 patient will need insulin to get her diabetes under control  Continue Lantus Premeal Humalog and sliding scale insulin  Consider resuming metformin at discharge  Patient will need testing supplies and insulin prescriptions on discharge and close outpatient follow-up  Diabetic teaching    HTN (hypertension)  Assessment & Plan  Start losartan and monitor blood pressure    Uterine fibroid  Assessment & Plan  Noted on CT outpatient follow-up with GYN    Vaginal yeast infection- (present on admission)  Assessment & Plan  Received oral fluconazole    Bacteremia due to group B Streptococcus- (present on admission)  Assessment & Plan  Blood cultures + March 14 1 out of 2 for repeat streptococcal bacteremia.  .  No growth to date blood cultures starting March 16  -Continuing Ancef  -TTE unable to visualize any valves.  Considering ILDA later hospital course pending clinical course and cultures.  -MRI cervical and thoracic spine: Negative for osteomyelitis/discitis      Pyelonephritis- (present on admission)  Assessment & Plan  Suspected pyelonephritis present on CT scan on March 15.   Likely source of group B streptococcal bacteremia   -Continuing cefazolin.   -ID following  No valvular abnormalities on surface echo but poor visualization's of valves  Follow-up on repeat blood cultures from 3/16/2024    Blood in stool- (present on admission)  Assessment & Plan  Hemoccult positive    Hemoglobin overall stable  Reviewed with patient that she will need outpatient GI evaluation with upper and lower endoscopies  Monitor hemoglobin    Sepsis (HCC)- (present on admission)  Assessment & Plan  Source pyelonephritis  Blood cultures group B strep  ID following continue IV cefazolin and follow-up on repeat blood cultures from 3/16/2024    Transaminitis- (present on admission)  Assessment & Plan  Improved  Reviewed CT abdomen from 3/15/2024 revealing fatty liver infiltration    Diabetic control monitor LFTs and outpatient follow-up    Poor compliance- (present on admission)  Assessment & Plan  Patient with poorly controlled diabetes, concern of compliance  Complicated by recent move will need to establish with PCP discussed with case management         VTE prophylaxis: Given positive Hemoccults scd     Greater than 51 minutes spent prepping to see patient (e.g. review of tests) obtaining and/or reviewing separately obtained history. Performing a medically appropriate examination and/ evaluation.  Counseling and educating the patient/family/caregiver.  Ordering medications, tests, or procedures.  Referring and communicating with other health care professionals.  Documenting clinical information in EPIC.  Independently interpreting results and communicating results to patient/family/caregiver.  Care coordination.    I have performed a physical exam and reviewed and updated ROS and Plan today (3/19/2024). In review of yesterday's note (3/18/2024), there are no changes except as documented above.

## 2024-03-19 NOTE — PROGRESS NOTES
Bedside report received from Shaila LEZAMA. Pt assessment complete. Pt AO x 4. Reviewed plan of care with pt. Pt tele monitored. Chart and labs reviewed. Bed in lowest position, and 2 side rails up. Pt educated on call lights, call light within reach. Hourly rounding in place

## 2024-03-19 NOTE — DISCHARGE PLANNING
Care Transition Team Assessment  In the case of an emergency, pt's legal NOK is spouse, Asim Jha     RNCM met with pt at bedside and obtained the information used in this assessment. Pt is currenlt homeless, living in car with spouse. No permanent address. Is in the process of re-locating  here from [place].Not yet established with local pharmacy. [facility] is contracted with [facility] [facility]. Prior to current hospitalization, pt was completely independent in ADLS/IADLS. Pt has a limited support system. Pt denies any hx of substance use and denies any dx of mh. Only support is s/o, owns no DME and lives in car.    Information Source:pt  Orientation Level: Oriented X4  Information Given By: Patient  Informant's Name: Tamera  Who is responsible for making decisions for patient? : Patient         Elopement Risk  Legal Hold: No  Ambulatory or Self Mobile in Wheelchair: Yes  Disoriented: No  Psychiatric Symptoms: None  History of Wandering: No  Elopement this Admit: No  Vocalizing Wanting to Leave: No  Displays Behaviors, Body Language Wanting to Leave: No-Not at Risk for Elopement  Elopement Risk: Not at Risk for Elopement    Interdisciplinary Discharge Planning  Does Admitting Nurse Feel This Could be a Complex Discharge?: Yes  Primary Care Physician: none  Lives with - Patient's Self Care Capacity: Spouse  Patient or legal guardian wants to designate a caregiver: No  Support Systems: Spouse / Significant Other  Housing / Facility: Homeless  Do You Take your Prescribed Medications Regularly: Yes  Mobility Issues: No  Prior Services: None  Durable Medical Equipment: Not Applicable    Discharge Preparedness  What is your plan after discharge?: Home with help  What are your discharge supports?: Partner  Prior Functional Level: Ambulatory, Drives Self, Independent with Activities of Daily Living, Independent with Medication Management  Difficulity with ADLs: None  Difficulity with IADLs: None    Functional  Assesment  Prior Functional Level: Ambulatory, Drives Self, Independent with Activities of Daily Living, Independent with Medication Management    Finances  Prescription Coverage: Yes    Vision / Hearing Impairment  Right Eye Vision: Wears Glasses  Left Eye Vision: Wears Glasses              Domestic Abuse  Have you ever been the victim of abuse or violence?: No    Psychological Assessment  History of Substance Abuse: None  History of Psychiatric Problems: No    Discharge Risks or Barriers  Discharge risks or barriers?: Post-acute placement / services, Non-adherence to medication or treatment, Homeless / couch surfing  Patient risk factors: Homeless, Lack of outside supports, Noncompliance, No PCP    Anticipated Discharge Information  Discharge Disposition: Discharged to home/self care (01)

## 2024-03-19 NOTE — DISCHARGE PLANNING
Case Management Discharge Planning    Admission Date: 3/14/2024  GMLOS: 4.1  ALOS: 5    6-Clicks ADL Score: 24  6-Clicks Mobility Score: 24      Anticipated Discharge Dispo: Discharge Disposition: Discharged to home/self care (01)    DME Needed: No    Action(s) Taken: Updated Provider/Nurse on Discharge Plan and DC Assessment Complete (See below)    Escalations Completed: None    Medically Clear: No    Next Steps: Pt will discharge to car with spouse at discharge. Provided [facility]. Diabetic educator to see pt today. Arranged new PCP appointment with [facility] new Monday. Information in AVS.    Barriers to Discharge: Medical clearance    Is the patient up for discharge tomorrow: No

## 2024-03-19 NOTE — CONSULTS
Diabetes education: Pt has a hx of diabetes, previously on oral agents but recently changed to insulin in [place]. Pt and SO state education on diabetes was brief, but she knows how to use insulin pens and do finger sticks.  Pt was moving from [place] and states she is homeless, living in her car. Pt was admitted with blood sugar of 485 and Hga1c of 11.1%. Pt is currently on Glargine 25 units in AM with Lispro 6 units tid meals and Lispro per sliding scale coverage ac and hs. Blood sugars are 178 (  2+ 6 units), 197 ( 2 + 6 units), 163 ( 2 + 6 units ), and 207 ( 3 units).  Met with pt and SO this afternoon. Pt was attempting to eat. CDE spoke briefly about insulin ( pt has some knowledge), and finger sticks.  Plan: CDE to follow up tomorrow for more education to include brief review of insulin pens. Pt will need Basaglar kwik pens ( for 30 days), Novolog flex pens ( 30 days with sliding scale written out and for ac only), pen needles, True metrix meter, strips and lancets all to [facility] at discharge. Please use diabetes supplies, F2 for correct meter and dx code. Pt will need ice pack at discharge for insulin as well.

## 2024-03-19 NOTE — DISCHARGE INSTR - CASE MGT
New Primary Care provider: Dr. Armando Reyes [facility]  [address], [place]. Phone . First appointment is Monday March 25 at 1:00.

## 2024-03-19 NOTE — PROGRESS NOTES
Monitor summary per monitor tech report:    SR, rate 70-91  Ectopy: rPVCs  .14/.07/.39

## 2024-03-19 NOTE — PROGRESS NOTES
Infectious Disease Progress Note    Author: Mary Newman M.D. Date & Time of service: 3/19/2024  9:53 AM    Chief Complaint:  Follow-up for bacteremia     Interval History:  3/17 Tmax 98.8, creatinine 0.46, magnesium 1.9, cultures as below, HIV negative, white count down to 6.8.  Patient states she has a vaginal yeast infection  3/18 AF, O2 RA, abdominal pain and some ongoing nausea, bilateral flank pain and back pain.  She is also reporting some mouth and throat soreness.      Review of Systems:  Review of Systems   Respiratory:  Negative for cough.    Gastrointestinal:  Positive for abdominal pain and nausea. Negative for constipation, diarrhea and vomiting.   Genitourinary:  Positive for flank pain. Negative for dysuria.   Musculoskeletal:  Positive for back pain, joint pain and myalgias.   Psychiatric/Behavioral:  The patient is not nervous/anxious.        Hemodynamics:  Temp (24hrs), Av.4 °C (97.6 °F), Min:36.3 °C (97.3 °F), Max:36.7 °C (98.1 °F)  Temperature: 36.5 °C (97.7 °F)  Pulse  Av.2  Min: 64  Max: 121   Blood Pressure: (!) 163/105       Physical Exam:  Physical Exam  Cardiovascular:      Rate and Rhythm: Normal rate and regular rhythm.      Heart sounds: Normal heart sounds.   Pulmonary:      Effort: Pulmonary effort is normal.      Breath sounds: Normal breath sounds.   Abdominal:      General: Abdomen is flat.      Palpations: Abdomen is soft.      Tenderness: There is abdominal tenderness. There is right CVA tenderness and left CVA tenderness.   Musculoskeletal:         General: Normal range of motion.   Skin:     General: Skin is warm and dry.   Neurological:      General: No focal deficit present.      Mental Status: She is oriented to person, place, and time.   Psychiatric:         Mood and Affect: Mood normal.         Behavior: Behavior normal.         Meds:    Current Facility-Administered Medications:     amLODIPine    losartan    insulin GLARGINE **AND** insulin lispro **AND**  insulin lispro **AND** POC blood glucose manual result **AND** NOTIFY MD and PharmD **AND** Administer 20 grams of glucose (approximately 8 ounces of fruit juice) every 15 minutes PRN FSBG less than 70 mg/dL **AND** dextrose bolus    omeprazole    ceFAZolin    LR    oxyCODONE immediate-release **OR** oxyCODONE immediate-release    acetaminophen    senna-docusate **AND** polyethylene glycol/lytes    labetalol    Labs:  Recent Labs     03/17/24 0840 03/18/24 0258 03/19/24 0442   WBC 6.8 5.8 7.2   RBC 3.95* 3.89* 4.40   HEMOGLOBIN 11.9* 11.9* 13.2   HEMATOCRIT 34.5* 34.0* 38.9   MCV 87.3 87.4 88.4   MCH 30.1 30.6 30.0   RDW 47.8 47.8 47.5   PLATELETCT 143* 142* 216   MPV 9.5 9.7 9.4   NEUTSPOLYS 64.20 57.20  --    LYMPHOCYTES 20.90* 24.80  --    MONOCYTES 13.10 13.70*  --    EOSINOPHILS 0.90 3.10  --    BASOPHILS 0.30 0.50  --      Recent Labs     03/17/24 0225 03/18/24 0258 03/19/24 0442   SODIUM 137 138 140   POTASSIUM 3.7 3.6 4.4   CHLORIDE 108 106 102   CO2 19* 21 26   GLUCOSE 198* 182* 185*   BUN 6* 5* 4*     Recent Labs     03/17/24 0225 03/18/24 0258 03/19/24 0442   ALBUMIN  --  3.0* 3.7   TBILIRUBIN  --  0.6 0.4   ALKPHOSPHAT  --  274* 624*   TOTPROTEIN  --  5.3* 6.3   ALTSGPT  --  44 110*   ASTSGOT  --  172* 407*   CREATININE 0.46* 0.35* 0.39*       Imaging:  MR-THORACIC SPINE-WITH & W/O    Result Date: 3/18/2024  3/17/2024 3:50 PM HISTORY/REASON FOR EXAM:  bacteremia, back pain TECHNIQUE/EXAM DESCRIPTION: MRI of the thoracic spine without and with contrast. The study was performed on a [employer] 1.5 Cris MRI scanner. T1 sagittal, T2 fast spin-echo sagittal, and T2 axial images were obtained of the thoracic spine. T1 post-contrast fat suppressed sagittal images were obtained. Optional T1 post-contrast axial images may be obtained. 20 mL ProHance contrast was administered intravenously. COMPARISON:  9/7/2005, CT thoracic spine, CT abdomen pelvis dated 3/15/2024 FINDINGS: Thoracic vertebral body  alignment is within normal limits. There is a superior endplate contour deformity at T6 with approximately 50% loss of height without associated bone marrow edema suggesting a chronic compression fracture. Spinal cord signal intensity in the thoracic spine is within normal limits. The conus terminates at the L1 level comment just outside the field-of-view but seen on the  images. Type II bone marrow changes are noted of the adjacent endplates of the T11-T12 level. Bone marrow signal intensity is otherwise within normal limits. Postcontrast images through the thoracic spine do not demonstrate any abnormal enhancement.     Remote superior end plate compression deformity at T6 with 50% loss of height. There is no significant canal stenosis or foraminal narrowing of the thoracic spine. No abnormal enhancement is seen. Spinal cord signal is normal.    MR-CERVICAL SPINE-WITH & W/O    Result Date: 3/18/2024  3/17/2024 3:50 PM HISTORY/REASON FOR EXAM:  bacteremia, neck pain TECHNIQUE/EXAM DESCRIPTION: MRI of the cervical spine without and with contrast. The study was performed on a [employer] Sign[employer] 1.5 Cris MRI scanner. T1 sagittal, T2 fast spin-echo sagittal, T1 postcontrast fat-suppressed sagittal, and gradient-echo axial images were obtained of the cervical spine. 20 mL ProHance contrast were administered  intravenously. COMPARISON:  CT cervical spine dated 9/7/2005 FINDINGS:  There is grade 1 anterolisthesis of C7 over T1. Spinal cord signal intensity is within normal limits. The included portion of the posterior fossa is within normal limits. The cervical cord has a normal caliber course and signal intensity. There is no evidence of abnormal enhancement in the cervical cord or spine. No area of abnormal enhancement is seen. Inflammatory mucosal thickening is noted in the bilateral maxillary sinuses. Findings specific to each level are described below: C2-3: Normal C3-4:  Normal C4-5:  Normal C5-6: Normal C6-7: Normal  C7-T1: Grade 1 anterolisthesis of C7 over T1 with posterior central disc protrusion noted there is mild encroachment upon the spinal canal without cord impingement. Postcontrast images through the cervical spine do not demonstrate any abnormal enhancement.     1.  Grade 1 anterolisthesis of C7 over T1. Small central protrusion of the sella causes mild canal narrowing without cord impingement. 2.  No abnormal spinal cord signal is identified. There is no abnormal enhancement in the cervical spine to suggest an ongoing infectious process.    EC-ECHOCARDIOGRAM COMPLETE W/O CONT    Result Date: 3/16/2024  Transthoracic Echo Report Echocardiography Laboratory CONCLUSIONS Normal left ventricular systolic function. The left ventricular ejection fraction is visually estimated to be 55- 60%. Normal diastolic function. The right ventricle is not well visualized, but appears grossly normal in size and systolic function. Unable to estimate right ventricular systolic pressure due to an inadequate tricuspid regurgitant jet. The cardiac valves are not well visualized. No evidence of hemodynamically significanbt valvular abnormality based on Doppler evaluation. Normal inferior vena cava size with blunted inspiratory collapse. JAN ALESIA Exam Date:         2024                    15:07 Exam Location:     Inpatient Priority:          Routine Ordering Physician:        NIKA VIERA JR Referring Physician:       [id number] JR Sonographer:               [facility] Age:    48     Gender:    F MRN:    [id number] :    1975 BSA:    1.97   Ht (in):    65     Wt (lb):    198 Exam Type:     Complete Indications:     Fever ICD Codes:       780.6 CPT Codes:       36188 BP:   132    /   65     HR:   85 Technical Quality:       Fair MEASUREMENTS  (Male / Female) Normal Values 2D ECHO LV Diastolic Diameter PLAX        4.1 cm                4.2 - 5.9 / 3.9 - 5.3 cm LV Systolic Diameter PLAX         3.5 cm                 2.1 - 4.0 cm IVS Diastolic Thickness           1 cm                  LVPW Diastolic Thickness          0.8 cm                LVOT Diameter                     2.1 cm                Estimated LV Ejection Fraction    60 %                  LV Ejection Fraction MOD BP       60.2 %                >= 55  % LV Ejection Fraction MOD 4C       59.3 %                LV Ejection Fraction MOD 2C       59.3 %                LV Ejection Fraction 4C AL        58.6 %                LV Ejection Fraction 2C AL        58.8 %                LA Volume Index                   17.1 cm3/m2           16 - 28 cm3/m2 DOPPLER AV Peak Velocity                  1.2 m/s               AV Peak Gradient                  6.2 mmHg              AV Mean Gradient                  3 mmHg                LVOT Peak Velocity                1.1 m/s               AV Area Cont Eq vti               3.2 cm2               Mitral E Point Velocity           0.88 m/s              Mitral E to A Ratio               0.98                  MV Pressure Half Time             59 ms                 MV Area PHT                       3.7 cm2               MV Deceleration Time              202 ms                TV Peak E Velocity                0.37 m/s              LV E' Lateral Velocity            11.2 cm/s             Mitral E to LV E' Lateral Ratio   7.9                   LV E' Septal Velocity             9.4 cm/s              Mitral E to LV E' Septal Ratio    9.4                   * Indicates values subject to auto-interpretation LV EF:  60    % FINDINGS Left Ventricle Grossly normal left ventricular chamber size. Insufficient endocardial definition to accurately measure wall thickness. Normal left ventricular systolic function. The left ventricular ejection fraction is visually estimated to be 55-60%. Normal regional wall motion. Normal diastolic function. Right Ventricle The right ventricle is not well visualized, but appears grossly normal in size and systolic function.  Right Atrium The right atrium is grossly normal in size. Normal inferior vena cava size with blunted inspiratory collapse. Left Atrium The left atrium is grossly normal in size. Mitral Valve The mitral valve is not well visualized. No significant stenosis or regurgitation by Doppler evaluation. Aortic Valve The aortic valve is not well visualized. No significant stenosis or regurgitation by Doppler evaluation. Tricuspid Valve The tricuspid valve is not well visualized. No significant stenosis or regurgitation by Doppler evaluation. Unable to estimate right ventricular systolic pressure due to an inadequate tricuspid regurgitant jet. Pulmonic Valve The pulmonic valve is not well visualized. No significant stenosis or regurgitation by Doppler evaluation. Pericardium No pericardial effusion. Aorta Normal aortic root for body surface area. The ascending aorta diameter is 2.9 cm. Mike Melendez MD (Electronically Signed) Final Date:     16 March 2024                 21:00    CT-ABDOMEN-PELVIS WITH    Result Date: 3/15/2024  3/15/2024 12:24 PM HISTORY/REASON FOR EXAM:  bacteremia, N/V abd pain. TECHNIQUE/EXAM DESCRIPTION:   CT scan of the abdomen and pelvis with contrast. Contrast-enhanced helical scanning was obtained from the diaphragmatic domes through the pubic symphysis following the bolus administration of nonionic contrast without complication. 100 mL of Omnipaque 350 nonionic contrast was administered without complication. Low dose optimization technique was utilized for this CT exam including automated exposure control and adjustment of the mA and/or kV according to patient size. COMPARISON: CT chest abdomen and pelvis 9/7/2005 FINDINGS: Lower Chest: Dependent atelectasis opacities in the lower lobes. Liver: Hepatic fatty infiltration. Spleen: Unremarkable. Pancreas: Unremarkable. Gallbladder: The gallbladder has been resected. Biliary: Nondilated. Adrenal glands: Normal. Kidneys: Mild patchy decreased  attenuation in the right kidney which is suspicious for pyelonephritis. Bowel: No obstruction or acute inflammation. Appendectomy as questioned, correlate clinically. Lymph nodes: No adenopathy. Vasculature: Unremarkable. Peritoneum: Unremarkable without ascites. Musculoskeletal: No acute or destructive process. Pelvis: No adenopathy or free fluid. Zheng catheter in the bladder. Urinary bladder is decompressed. Mass at the left aspect of uterus measures 3.2 x 2.5 cm. Statistically this most likely represents a uterine fibroid.     1.  Right-sided pyelonephritis is suspected. 2.  3.2 cm uterine mass which statistically most likely represents uterine fibroid. 3.  Fatty liver infiltration.    US-RUQ    Result Date: 3/14/2024  3/14/2024 5:18 PM HISTORY/REASON FOR EXAM:  Abnormal Labs Abdominal pain TECHNIQUE/EXAM DESCRIPTION AND NUMBER OF VIEWS:  Real-time sonography of the liver and biliary tree. COMPARISON: None FINDINGS: Exam somewhat difficult to perform due to bowel gas and patient's labored breathing. The liver is normal in contour with increased echogenicity. There is no evidence of solid mass lesion. The liver measures 18.38 cm. The gallbladder is surgically absent. The common duct measures 7.00 mm. The pancreas is obscured by bowel gas. The aorta is obscured by bowel gas. Intrahepatic IVC is not well visualized due to bowel gas. The portal vein is patent with hepatopetal flow. The MPV measures 0.96 cm cm. The right kidney measures 11.29 cm. There is no ascites.     1.  The liver is heterogeneously echogenic, most compatible with fatty infiltration, however other hepatocellular disease processes are in the differential diagnosis. 2.  Surgically absent gallbladder with minimal probable physiologic prominence of the common bile duct. 3.  Exam limited by bowel gas.    DX-CHEST-PORTABLE (1 VIEW)    Result Date: 3/14/2024  3/14/2024 12:59 PM HISTORY/REASON FOR EXAM:  Shortness of Breath. Hyperglycemia. TECHNIQUE/EXAM  "DESCRIPTION AND NUMBER OF VIEWS: Single portable view of the chest. COMPARISON: 9/7/2005 FINDINGS: The mediastinal and cardiac silhouette is unremarkable. The pulmonary vascularity is within normal limits. Lungs demonstrate no airspace process. Questionable small nodule of the right upper lobe versus bone island in rib. There is no significant pleural effusion. There is no visible pneumothorax. There are no acute bony abnormalities.     1.  There is no acute cardiopulmonary process.      Micro:  Results       Procedure Component Value Units Date/Time    BLOOD CULTURE x2 [[id number]]  (Abnormal)  (Susceptibility) Collected: 03/14/24 1305    Order Status: Completed Specimen: Blood from Peripheral Updated: 03/18/24 0838     Significant Indicator POS     Source BLD     Site PERIPHERAL     Culture Result Growth detected by Bactec instrument. 03/15/2024  02:07      Streptococcus agalactiae (Group B)    Narrative:      CALL  Henry  161 tel. [phone],  CALLED  161 tel. [phone] 03/15/2024, 02:10, RB PERF. RESULTS CALLED TO: RN  [id number]  Per Hospital Policy: Only change Specimen Src: to \"Line\" if  specified by physician order.  Left AC    Susceptibility       Streptococcus agalactiae (group b) (1)       Antibiotic Interpretation Microscan   Method Status    Daptomycin Sensitive <=0.5 mcg/mL KHAI Final    Clindamycin Resistant - mcg/mL KHAI Final    Penicillin Sensitive <=0.03 mcg/mL KHAI Final                       BLOOD CULTURE [[id number]] Collected: 03/16/24 0045    Order Status: Completed Specimen: Blood from Peripheral Updated: 03/17/24 0741     Significant Indicator NEG     Source BLD     Site PERIPHERAL     Culture Result No Growth  Note: Blood cultures are incubated for 5 days and  are monitored continuously.Positive blood cultures  are called to the RN and reported as soon as  they are identified.      Narrative:      Per Hospital Policy: Only change Specimen Src: to \"Line\" if  specified by physician order.  Left Hand " "   BLOOD CULTURE [[id number]] Collected: 03/16/24 0141    Order Status: Completed Specimen: Blood from Peripheral Updated: 03/17/24 0741     Significant Indicator NEG     Source BLD     Site PERIPHERAL     Culture Result No Growth  Note: Blood cultures are incubated for 5 days and  are monitored continuously.Positive blood cultures  are called to the RN and reported as soon as  they are identified.      Narrative:      Per Hospital Policy: Only change Specimen Src: to \"Line\" if  specified by physician order.  Left Hand    BLOOD CULTURE x2 [[id number]] Collected: 03/14/24 1335    Order Status: Completed Specimen: Blood from Peripheral Updated: 03/15/24 1532     Significant Indicator NEG     Source BLD     Site PERIPHERAL     Culture Result No Growth  Note: Blood cultures are incubated for 5 days and  are monitored continuously.Positive blood cultures  are called to the RN and reported as soon as  they are identified.      Narrative:      Per Hospital Policy: Only change Specimen Src: to \"Line\" if  specified by physician order.  Right Hand    URINALYSIS [[id number]]  (Abnormal) Collected: 03/14/24 1225    Order Status: Completed Specimen: Urine Updated: 03/14/24 1250     Color Yellow     Character Clear     Specific Gravity 1.022     Ph 5.0     Glucose >=1000 mg/dL      Ketones >=160 mg/dL      Protein 100 mg/dL      Bilirubin Negative     Urobilinogen, Urine 0.2     Nitrite Negative     Leukocyte Esterase Trace     Occult Blood Trace     Micro Urine Req Microscopic            Assessment:  Active Hospital Problems    Diagnosis     *DKA, type 2, not at goal (HCC) [E11.10]     Uterine fibroid [D25.9]     HTN (hypertension) [I10]     Vaginal yeast infection [B37.31]     Pyelonephritis [N12]     Bacteremia due to group B Streptococcus [R78.81, B95.1]     Transaminitis [R74.01]     Sepsis (HCC) [A41.9]     Blood in stool [K92.1]     Poor compliance [Z91.199]      Interval 24 hours:      AF, O2 RA  Labs reviewed to assess for " clinical status, drug toxicity and organ function  Micro reviewed    Patient still reporting diffuse pain involving abdomen, back flanks and shoulder.  Still reporting some soreness in her mouth and throat.  Continued on antibiotics and antifungal below.    Assessment:  Tamera Jha is a 48 y.o. female patient with uncontrolled type 2 diabetes, admitted with DKA and found to have right-sided pyelonephritis with group B Strep bacteremia     Pertinent Diagnoses:  Group B Strep bacteremia  -Blood cultures on 3/14 positive for Streptococcus agalactiae  -Blood culture on 3/16 is no growth to date  Right-sided pyonephritis  DKA  Uncontrolled type 2 diabetes  Transaminase elevation, worse today  Vaginal yeast infection, per patient report, complaining of ongoing discharge and itching  Complaining of sore mouth and throat, possible thrush but not obvious on exam     Plan:  -Continue IV Ancef 2 g every 8 hours, will recommend a 2-week antibiotic course for bacteremia and pyelonephritis, on discharge okay to transition to p.o. linezolid 60 mg twice daily to complete course and provide medication via meds to beds, end date of antibiotics 3/29/24   -3-day course of fluconazole 150 mg daily  -Continue nystatin powder  -Follow repeat blood cultures x 2 from 3/16, pending, no growth till date, if any new positive blood cultures, please notify ID  -Valves not well-visualized on TTE.  Will consider ILDA if repeat positive blood cultures, new fevers or other concerning symptoms.    -HIV and hepatitis C antibodies negative  -MRI of the cervical and thoracic spine with contrast with no obvious infectious findings     Disposition: Okay for discharge from ID perspective      Plan was discussed with the primary team, Dr. Barrios.  ID will sign off.

## 2024-03-19 NOTE — CARE PLAN
The patient is Stable - Low risk of patient condition declining or worsening    Shift Goals  Clinical Goals: pain control, diabetic education  Patient Goals: pain control, diabetic education  Family Goals: diabetic education    Progress made toward(s) clinical / shift goals:  Pt able to communicate her pain level on 0-10 scale and requests pain medication when it is available. Pt and  express excitement for another session with diabetic educator today      Problem: Knowledge Deficit - Standard  Goal: Patient and family/care givers will demonstrate understanding of plan of care, disease process/condition, diagnostic tests and medications  Outcome: Progressing  Note: Pt voices questions about care plan and her next steps before discharge     Problem: Psychosocial  Goal: Patient's ability to verbalize feelings about condition will improve  Outcome: Progressing  Note: Pt tearful this morning after discussing plan with provider. She and  express eagerness to learn from diabetic education and verbalize they want to become self sufficient in managing her diabetes       Patient is not progressing towards the following goals:

## 2024-03-19 NOTE — PROGRESS NOTES
Diabetes education: Met with pt and SO. Please see consult note.   Plan: CDE to follow up tomorrow for more education to include brief review of insulin pens. Pt will need Basaglar kwik pens ( for 30 days), Novolog flex pens ( 30 days with sliding scale written out and for ac only), pen needles, True metrix meter, strips and lancets all to [facility] at discharge. Please use diabetes supplies, F2 for correct meter and dx code. Pt will need ice pack at discharge for insulin as well.  Please send text as pt needs to be seen, again, before discharge.

## 2024-03-20 LAB
ALBUMIN SERPL BCP-MCNC: 3.5 G/DL (ref 3.2–4.9)
ALP SERPL-CCNC: 638 U/L (ref 30–99)
ALT SERPL-CCNC: 120 U/L (ref 2–50)
ANION GAP SERPL CALC-SCNC: 9 MMOL/L (ref 7–16)
AST SERPL-CCNC: 180 U/L (ref 12–45)
BILIRUB CONJ SERPL-MCNC: <0.2 MG/DL (ref 0.1–0.5)
BILIRUB INDIRECT SERPL-MCNC: ABNORMAL MG/DL (ref 0–1)
BILIRUB SERPL-MCNC: 0.2 MG/DL (ref 0.1–1.5)
BUN SERPL-MCNC: 5 MG/DL (ref 8–22)
CALCIUM SERPL-MCNC: 9 MG/DL (ref 8.5–10.5)
CHLORIDE SERPL-SCNC: 105 MMOL/L (ref 96–112)
CO2 SERPL-SCNC: 22 MMOL/L (ref 20–33)
CREAT SERPL-MCNC: 0.46 MG/DL (ref 0.5–1.4)
ERYTHROCYTE [DISTWIDTH] IN BLOOD BY AUTOMATED COUNT: 47.3 FL (ref 35.9–50)
GFR SERPLBLD CREATININE-BSD FMLA CKD-EPI: 118 ML/MIN/1.73 M 2
GLUCOSE BLD STRIP.AUTO-MCNC: 158 MG/DL (ref 65–99)
GLUCOSE BLD STRIP.AUTO-MCNC: 177 MG/DL (ref 65–99)
GLUCOSE BLD STRIP.AUTO-MCNC: 213 MG/DL (ref 65–99)
GLUCOSE BLD STRIP.AUTO-MCNC: 234 MG/DL (ref 65–99)
GLUCOSE BLD STRIP.AUTO-MCNC: 263 MG/DL (ref 65–99)
GLUCOSE SERPL-MCNC: 158 MG/DL (ref 65–99)
HCT VFR BLD AUTO: 34.7 % (ref 37–47)
HGB BLD-MCNC: 11.7 G/DL (ref 12–16)
MCH RBC QN AUTO: 29.6 PG (ref 27–33)
MCHC RBC AUTO-ENTMCNC: 33.7 G/DL (ref 32.2–35.5)
MCV RBC AUTO: 87.8 FL (ref 81.4–97.8)
PLATELET # BLD AUTO: 232 K/UL (ref 164–446)
PMV BLD AUTO: 9.4 FL (ref 9–12.9)
POTASSIUM SERPL-SCNC: 4.1 MMOL/L (ref 3.6–5.5)
PROT SERPL-MCNC: 6.3 G/DL (ref 6–8.2)
RBC # BLD AUTO: 3.95 M/UL (ref 4.2–5.4)
SODIUM SERPL-SCNC: 136 MMOL/L (ref 135–145)
WBC # BLD AUTO: 7 K/UL (ref 4.8–10.8)

## 2024-03-20 PROCEDURE — A9270 NON-COVERED ITEM OR SERVICE: HCPCS

## 2024-03-20 PROCEDURE — 700102 HCHG RX REV CODE 250 W/ 637 OVERRIDE(OP): Performed by: INTERNAL MEDICINE

## 2024-03-20 PROCEDURE — 82962 GLUCOSE BLOOD TEST: CPT | Mod: 91

## 2024-03-20 PROCEDURE — 700102 HCHG RX REV CODE 250 W/ 637 OVERRIDE(OP)

## 2024-03-20 PROCEDURE — A9270 NON-COVERED ITEM OR SERVICE: HCPCS | Performed by: INTERNAL MEDICINE

## 2024-03-20 PROCEDURE — 80076 HEPATIC FUNCTION PANEL: CPT

## 2024-03-20 PROCEDURE — A9270 NON-COVERED ITEM OR SERVICE: HCPCS | Performed by: HOSPITALIST

## 2024-03-20 PROCEDURE — 99233 SBSQ HOSP IP/OBS HIGH 50: CPT | Performed by: INTERNAL MEDICINE

## 2024-03-20 PROCEDURE — 700102 HCHG RX REV CODE 250 W/ 637 OVERRIDE(OP): Performed by: HOSPITALIST

## 2024-03-20 PROCEDURE — 80048 BASIC METABOLIC PNL TOTAL CA: CPT

## 2024-03-20 PROCEDURE — 85027 COMPLETE CBC AUTOMATED: CPT

## 2024-03-20 PROCEDURE — 700111 HCHG RX REV CODE 636 W/ 250 OVERRIDE (IP): Performed by: INTERNAL MEDICINE

## 2024-03-20 PROCEDURE — 770020 HCHG ROOM/CARE - TELE (206)

## 2024-03-20 PROCEDURE — 700101 HCHG RX REV CODE 250: Performed by: INTERNAL MEDICINE

## 2024-03-20 RX ORDER — NYSTATIN 100000 [USP'U]/G
POWDER TOPICAL 2 TIMES DAILY
Status: DISCONTINUED | OUTPATIENT
Start: 2024-03-20 | End: 2024-03-21 | Stop reason: HOSPADM

## 2024-03-20 RX ADMIN — INSULIN LISPRO 6 UNITS: 100 INJECTION, SOLUTION INTRAVENOUS; SUBCUTANEOUS at 12:08

## 2024-03-20 RX ADMIN — OXYCODONE HYDROCHLORIDE 10 MG: 10 TABLET ORAL at 06:26

## 2024-03-20 RX ADMIN — INSULIN LISPRO 3 UNITS: 100 INJECTION, SOLUTION INTRAVENOUS; SUBCUTANEOUS at 12:08

## 2024-03-20 RX ADMIN — INSULIN LISPRO 2 UNITS: 100 INJECTION, SOLUTION INTRAVENOUS; SUBCUTANEOUS at 07:49

## 2024-03-20 RX ADMIN — NYSTATIN: 100000 POWDER TOPICAL at 18:00

## 2024-03-20 RX ADMIN — OMEPRAZOLE 20 MG: 20 CAPSULE, DELAYED RELEASE ORAL at 06:25

## 2024-03-20 RX ADMIN — ACETAMINOPHEN 650 MG: 325 TABLET, FILM COATED ORAL at 12:39

## 2024-03-20 RX ADMIN — OXYCODONE HYDROCHLORIDE 10 MG: 10 TABLET ORAL at 18:45

## 2024-03-20 RX ADMIN — AMLODIPINE BESYLATE 5 MG: 5 TABLET ORAL at 06:25

## 2024-03-20 RX ADMIN — CEFAZOLIN 2 G: 10 INJECTION, POWDER, FOR SOLUTION INTRAVENOUS at 13:57

## 2024-03-20 RX ADMIN — ACETAMINOPHEN 650 MG: 325 TABLET, FILM COATED ORAL at 00:19

## 2024-03-20 RX ADMIN — INSULIN LISPRO 3 UNITS: 100 INJECTION, SOLUTION INTRAVENOUS; SUBCUTANEOUS at 22:16

## 2024-03-20 RX ADMIN — OXYCODONE HYDROCHLORIDE 10 MG: 10 TABLET ORAL at 00:19

## 2024-03-20 RX ADMIN — OXYCODONE HYDROCHLORIDE 10 MG: 10 TABLET ORAL at 12:38

## 2024-03-20 RX ADMIN — INSULIN LISPRO 5 UNITS: 100 INJECTION, SOLUTION INTRAVENOUS; SUBCUTANEOUS at 17:26

## 2024-03-20 RX ADMIN — OMEPRAZOLE 20 MG: 20 CAPSULE, DELAYED RELEASE ORAL at 17:26

## 2024-03-20 RX ADMIN — INSULIN GLARGINE-YFGN 25 UNITS: 100 INJECTION, SOLUTION SUBCUTANEOUS at 06:24

## 2024-03-20 RX ADMIN — ACETAMINOPHEN 650 MG: 325 TABLET, FILM COATED ORAL at 06:46

## 2024-03-20 RX ADMIN — LOSARTAN POTASSIUM 50 MG: 50 TABLET, FILM COATED ORAL at 06:27

## 2024-03-20 RX ADMIN — CEFAZOLIN 2 G: 10 INJECTION, POWDER, FOR SOLUTION INTRAVENOUS at 22:20

## 2024-03-20 RX ADMIN — ACETAMINOPHEN 650 MG: 325 TABLET, FILM COATED ORAL at 18:45

## 2024-03-20 RX ADMIN — CEFAZOLIN 2 G: 10 INJECTION, POWDER, FOR SOLUTION INTRAVENOUS at 06:26

## 2024-03-20 RX ADMIN — INSULIN LISPRO 6 UNITS: 100 INJECTION, SOLUTION INTRAVENOUS; SUBCUTANEOUS at 07:50

## 2024-03-20 RX ADMIN — INSULIN LISPRO 6 UNITS: 100 INJECTION, SOLUTION INTRAVENOUS; SUBCUTANEOUS at 17:26

## 2024-03-20 ASSESSMENT — PAIN DESCRIPTION - PAIN TYPE
TYPE: ACUTE PAIN

## 2024-03-20 ASSESSMENT — FIBROSIS 4 INDEX: FIB4 SCORE: 7.86

## 2024-03-20 ASSESSMENT — ENCOUNTER SYMPTOMS
BACK PAIN: 1
NAUSEA: 0
ABDOMINAL PAIN: 0
SHORTNESS OF BREATH: 0
VOMITING: 0
FEVER: 0

## 2024-03-20 NOTE — PROGRESS NOTES
Diabetes education: Met with pt and SO this afternoon to complete education.  Discussed what diabetes was,  type two, hypoglycemia,  hyperglycemia, and goals for blood sugars. Discussed need for carbohydrates and proteins, with every meal and   why. Discussed the importance of the HS snack with a carbohydrate and protein. Discussed need, benefit and precautions with exercise.  Discussed  what effects blood sugars. Discussed need to follow up with PCP. With aid from schedulers, pt has a new PCP appointment.  Insulin was discussed as well, insulin storage, shelf life and site rotation. Pt and SO practiced with saline pens, and practice device.  [facility] book and handout on insulin pens given.  Pt states she had some itching after Lispro shot ( she said same thing that happened in [place]). Unclear if it is an allergic reaction to Lispro. Per formulary, Novolog will be the fast insulin covered by her insurance.  Plan: Pt will need Basaglar kwik pens ( for 30 days), Novolog flex pens ( 30 days with sliding scale written out and for ac only), pen needles, True metrix meter, strips and lancets all to [facility] at discharge. Please use diabetes supplies, F2 for correct meter and dx code. Pt will need ice pack at discharge for insulin as well.

## 2024-03-20 NOTE — PROGRESS NOTES
Report received from night RN.     Pt AOx4, resting in bed. Telemetry monitor in place. Plan of care reviewed with pt, pt verbalizes understanding.    Bed set to lowest position, call light in reach.

## 2024-03-20 NOTE — PROGRESS NOTES
Hospital Medicine Daily Progress Note    Date of Service  3/20/2024    Chief Complaint  Tamera Jha is a 48 y.o. female admitted 3/14/2024 with pyelonephritis    Hospital Course  48 y.o. female who presented 3/14/2024 with with a history of diabetes, apparently previously only on metformin, inconsistent outpatient treatment, the patient presents with DKA and sepsis due to group B streptococcus bacteremia, the patient admitted to the ICU level of care, she had an ultrasound of the abdomen showing some fatty liver infiltration likely, CT of the abdomen consistent with right-sided pyelonephritis, uterine fibroid, ultrasound of the heart TTE without evidence of endocarditis, infectious disease consulted, in light of the patient's arthralgias, back and chest pain a MRI of the C and T-spine ordered, the patient concerned about thrush, difficulty swallowing, the patient is interested in further diabetic management and teaching and outpatient follow-up, she has been in transition to moved to [place] from [place], the transition apparently had some impact on her medical care.     Interval Problem Update    Patient transferred from ICU to telemetry floor  Reviewed hospitalization records  Reviewed MRI of C and thoracic spine with chronic T6 compression fracture no evidence of osteomyelitis or epidural abscess  Blood cultures from 314 growing group B strep  Blood cultures from 3/16/2024 remain negative  I reviewed chemistry panel electrolytes stable BUN 5 creatinine 0.3 alk phos 274  Reviewed CBC WBC 5.8 hemoglobin 11.9 platelets 142  I reviewed CBGs 163-197.  Hemoglobin A1c 11.1  Blood pressure is elevated I ordered losartan  3/19: Patient seen and examined, hs some nausea no vomiting, afebrile.  ID following regarding her bacteremia and case discussed appreciate rec.  DM cont on insulin diabetic education   3/20: Patient seen and examined, afebrile,LFT were elevated will recheck liver function test  Cont on abx as per ID  rec.  Cont on insulin for her DM     I have discussed this patient's plan of care and discharge plan at IDT rounds today with Case Management, Nursing, Nursing leadership, and other members of the IDT team.    Consultants/Specialty  infectious disease    Code Status  Full Code    Disposition  The patient is not medically cleared for discharge to home or a post-acute facility.      I have placed the appropriate orders for post-discharge needs.    Review of Systems  Review of Systems   Constitutional:  Negative for fever.   Respiratory:  Negative for shortness of breath.    Cardiovascular:  Negative for chest pain.   Gastrointestinal:  Negative for abdominal pain, nausea and vomiting.   Genitourinary:  Negative for dysuria and hematuria.   Musculoskeletal:  Positive for back pain.   All other systems reviewed and are negative.       Physical Exam  Temp:  [36.6 °C (97.9 °F)-36.9 °C (98.4 °F)] 36.9 °C (98.4 °F)  Pulse:  [67-79] 73  Resp:  [16-18] 16  BP: (130-149)/(85-97) 149/97  SpO2:  [95 %-98 %] 96 %    Physical Exam  Vitals and nursing note reviewed.   Constitutional:       General: She is not in acute distress.  HENT:      Head: Normocephalic and atraumatic.      Nose: Nose normal. No rhinorrhea.      Mouth/Throat:      Pharynx: No oropharyngeal exudate or posterior oropharyngeal erythema.   Eyes:      General:         Right eye: No discharge.         Left eye: No discharge.   Cardiovascular:      Rate and Rhythm: Normal rate and regular rhythm.      Heart sounds: Normal heart sounds. No murmur heard.     No friction rub. No gallop.   Pulmonary:      Effort: Pulmonary effort is normal. No respiratory distress.      Breath sounds: Normal breath sounds. No stridor. No wheezing, rhonchi or rales.   Chest:      Chest wall: No tenderness.   Abdominal:      General: Bowel sounds are normal. There is no distension.      Palpations: Abdomen is soft. There is no mass.      Tenderness: There is abdominal tenderness. There is  right CVA tenderness. There is no rebound.   Musculoskeletal:         General: No swelling or tenderness.      Cervical back: Neck supple. No rigidity.   Skin:     General: Skin is warm and dry.      Coloration: Skin is not cyanotic.      Nails: There is no clubbing.   Neurological:      General: No focal deficit present.      Mental Status: She is alert and oriented to person, place, and time.      Cranial Nerves: No cranial nerve deficit.      Motor: No weakness.   Psychiatric:         Mood and Affect: Mood normal.         Behavior: Behavior normal.         Fluids    Intake/Output Summary (Last 24 hours) at 3/20/2024 1244  Last data filed at 3/19/2024 1930  Gross per 24 hour   Intake 800 ml   Output 0 ml   Net 800 ml       Laboratory  Recent Labs     03/18/24  0258 03/19/24  0442 03/20/24  0321   WBC 5.8 7.2 7.0   RBC 3.89* 4.40 3.95*   HEMOGLOBIN 11.9* 13.2 11.7*   HEMATOCRIT 34.0* 38.9 34.7*   MCV 87.4 88.4 87.8   MCH 30.6 30.0 29.6   MCHC 35.0 33.9 33.7   RDW 47.8 47.5 47.3   PLATELETCT 142* 216 232   MPV 9.7 9.4 9.4     Recent Labs     03/18/24  0258 03/19/24  0442 03/20/24  0321   SODIUM 138 140 136   POTASSIUM 3.6 4.4 4.1   CHLORIDE 106 102 105   CO2 21 26 22   GLUCOSE 182* 185* 158*   BUN 5* 4* 5*   CREATININE 0.35* 0.39* 0.46*   CALCIUM 8.6 9.4 9.0                   Imaging  MR-THORACIC SPINE-WITH & W/O   Final Result         Remote superior end plate compression deformity at T6 with 50% loss of height.      There is no significant canal stenosis or foraminal narrowing of the thoracic spine. No abnormal enhancement is seen. Spinal cord signal is normal.      MR-CERVICAL SPINE-WITH & W/O   Final Result         1.  Grade 1 anterolisthesis of C7 over T1. Small central protrusion of the sella causes mild canal narrowing without cord impingement.      2.  No abnormal spinal cord signal is identified. There is no abnormal enhancement in the cervical spine to suggest an ongoing infectious process.       EC-ECHOCARDIOGRAM COMPLETE W/O CONT   Final Result      CT-ABDOMEN-PELVIS WITH   Final Result      1.  Right-sided pyelonephritis is suspected.   2.  3.2 cm uterine mass which statistically most likely represents uterine fibroid.   3.  Fatty liver infiltration.      US-RUQ   Final Result      1.  The liver is heterogeneously echogenic, most compatible with fatty infiltration, however other hepatocellular disease processes are in the differential diagnosis.   2.  Surgically absent gallbladder with minimal probable physiologic prominence of the common bile duct.   3.  Exam limited by bowel gas.      DX-CHEST-PORTABLE (1 VIEW)   Final Result      1.  There is no acute cardiopulmonary process.           Assessment/Plan  * DKA, type 2, not at goal (HCC)- (present on admission)  Assessment & Plan  Bicarb of 3, anion gap in the 30s on admission, beta hydroxybutyrate greater than 8 (unable to detect), point-of-care glucoses approximately 450.      DKA resolved  Given hemoglobin A1c greater than 10 patient will need insulin to get her diabetes under control  Continue Lantus Premeal Humalog and sliding scale insulin  Consider resuming metformin at discharge  Patient will need testing supplies and insulin prescriptions on discharge and close outpatient follow-up  Diabetic teaching    HTN (hypertension)  Assessment & Plan  Start losartan and monitor blood pressure    Uterine fibroid  Assessment & Plan  Noted on CT outpatient follow-up with GYN    Vaginal yeast infection- (present on admission)  Assessment & Plan  Received oral fluconazole    Bacteremia due to group B Streptococcus- (present on admission)  Assessment & Plan  Blood cultures + March 14 1 out of 2 for repeat streptococcal bacteremia.  .  No growth to date blood cultures starting March 16  -Continuing Ancef  -TTE unable to visualize any valves.  Considering ILDA later hospital course pending clinical course and cultures.  -MRI cervical and thoracic spine: Negative  for osteomyelitis/discitis      Pyelonephritis- (present on admission)  Assessment & Plan  Suspected pyelonephritis present on CT scan on March 15.  Likely source of group B streptococcal bacteremia   -Continuing cefazolin.   -ID following  No valvular abnormalities on surface echo but poor visualization's of valves  Follow-up on repeat blood cultures from 3/16/2024    Blood in stool- (present on admission)  Assessment & Plan  Hemoccult positive    Hemoglobin overall stable  Reviewed with patient that she will need outpatient GI evaluation with upper and lower endoscopies  Monitor hemoglobin    Sepsis (HCC)- (present on admission)  Assessment & Plan  Source pyelonephritis  Blood cultures group B strep  ID following continue IV cefazolin and follow-up on repeat blood cultures from 3/16/2024    Transaminitis- (present on admission)  Assessment & Plan  Improved  Reviewed CT abdomen from 3/15/2024 revealing fatty liver infiltration    Diabetic control monitor LFTs and outpatient follow-up    Poor compliance- (present on admission)  Assessment & Plan  Patient with poorly controlled diabetes, concern of compliance  Complicated by recent move will need to establish with PCP discussed with case management       Greater than 51 minutes spent prepping to see patient (e.g. review of tests) obtaining and/or reviewing separately obtained history. Performing a medically appropriate examination and/ evaluation.  Counseling and educating the patient/family/caregiver.  Ordering medications, tests, or procedures.  Referring and communicating with other health care professionals.  Documenting clinical information in EPIC.  Independently interpreting results and communicating results to patient/family/caregiver.  Care coordination.     VTE prophylaxis: Given positive Hemoccults scd     I have performed a physical exam and reviewed and updated ROS and Plan today (3/20/2024). In review of yesterday's note (3/19/2024), there are no  changes except as documented above.

## 2024-03-20 NOTE — CARE PLAN
The patient is Stable - Low risk of patient condition declining or worsening    Shift Goals  Clinical Goals: pain management; diabetes management  Patient Goals: pain management; diabetes management  Family Goals: diabetic education    Progress made toward(s) clinical / shift goals:    Problem: Pain - Standard  Goal: Alleviation of pain or a reduction in pain to the patient’s comfort goal  Outcome: Progressing     Problem: Knowledge Deficit - Standard  Goal: Patient and family/care givers will demonstrate understanding of plan of care, disease process/condition, diagnostic tests and medications  Outcome: Progressing     Problem: Psychosocial  Goal: Patient's level of anxiety will decrease  Outcome: Progressing     Problem: Respiratory  Goal: Patient will achieve/maintain optimum respiratory ventilation and gas exchange  Outcome: Progressing     Problem: Nutrition  Goal: Patient's nutritional and fluid intake will be adequate or improve  Outcome: Progressing     Problem: Fall Risk  Goal: Patient will remain free from falls  Outcome: Progressing     Problem: Skin Integrity  Goal: Skin integrity is maintained or improved  Outcome: Progressing       Patient is not progressing towards the following goals:

## 2024-03-20 NOTE — CARE PLAN
The patient is Stable - Low risk of patient condition declining or worsening    Shift Goals  Clinical Goals: pain management, DM menagement, self administer insulin  Patient Goals: pain management  Family Goals: diabetic education    Progress made toward(s) clinical / shift goals:        Problem: Pain - Standard  Goal: Alleviation of pain or a reduction in pain to the patient’s comfort goal  Outcome: Progressing     Problem: Knowledge Deficit - Standard  Goal: Patient and family/care givers will demonstrate understanding of plan of care, disease process/condition, diagnostic tests and medications  Outcome: Progressing       Patient is not progressing towards the following goals:

## 2024-03-21 ENCOUNTER — PHARMACY VISIT (OUTPATIENT)
Dept: PHARMACY | Facility: MEDICAL CENTER | Age: 49
End: 2024-03-21
Payer: COMMERCIAL

## 2024-03-21 VITALS
RESPIRATION RATE: 16 BRPM | DIASTOLIC BLOOD PRESSURE: 75 MMHG | TEMPERATURE: 97.3 F | HEIGHT: 65 IN | WEIGHT: 199.08 LBS | BODY MASS INDEX: 33.17 KG/M2 | HEART RATE: 68 BPM | SYSTOLIC BLOOD PRESSURE: 123 MMHG | OXYGEN SATURATION: 97 %

## 2024-03-21 LAB
ALBUMIN SERPL BCP-MCNC: 3.5 G/DL (ref 3.2–4.9)
ALBUMIN/GLOB SERPL: 1.7 G/DL
ALP SERPL-CCNC: 510 U/L (ref 30–99)
ALT SERPL-CCNC: 74 U/L (ref 2–50)
ANION GAP SERPL CALC-SCNC: 11 MMOL/L (ref 7–16)
AST SERPL-CCNC: 65 U/L (ref 12–45)
BACTERIA BLD CULT: NORMAL
BACTERIA BLD CULT: NORMAL
BILIRUB SERPL-MCNC: 0.2 MG/DL (ref 0.1–1.5)
BUN SERPL-MCNC: 8 MG/DL (ref 8–22)
CALCIUM ALBUM COR SERPL-MCNC: 9.4 MG/DL (ref 8.5–10.5)
CALCIUM SERPL-MCNC: 9 MG/DL (ref 8.5–10.5)
CHLORIDE SERPL-SCNC: 102 MMOL/L (ref 96–112)
CO2 SERPL-SCNC: 23 MMOL/L (ref 20–33)
CREAT SERPL-MCNC: 0.45 MG/DL (ref 0.5–1.4)
ERYTHROCYTE [DISTWIDTH] IN BLOOD BY AUTOMATED COUNT: 47.7 FL (ref 35.9–50)
GFR SERPLBLD CREATININE-BSD FMLA CKD-EPI: 119 ML/MIN/1.73 M 2
GLOBULIN SER CALC-MCNC: 2.1 G/DL (ref 1.9–3.5)
GLUCOSE BLD STRIP.AUTO-MCNC: 191 MG/DL (ref 65–99)
GLUCOSE BLD STRIP.AUTO-MCNC: 198 MG/DL (ref 65–99)
GLUCOSE BLD STRIP.AUTO-MCNC: 412 MG/DL (ref 65–99)
GLUCOSE SERPL-MCNC: 164 MG/DL (ref 65–99)
HCT VFR BLD AUTO: 35.7 % (ref 37–47)
HGB BLD-MCNC: 12.2 G/DL (ref 12–16)
MCH RBC QN AUTO: 30.3 PG (ref 27–33)
MCHC RBC AUTO-ENTMCNC: 34.2 G/DL (ref 32.2–35.5)
MCV RBC AUTO: 88.6 FL (ref 81.4–97.8)
PLATELET # BLD AUTO: 278 K/UL (ref 164–446)
PMV BLD AUTO: 9.2 FL (ref 9–12.9)
POTASSIUM SERPL-SCNC: 4 MMOL/L (ref 3.6–5.5)
PROT SERPL-MCNC: 5.6 G/DL (ref 6–8.2)
RBC # BLD AUTO: 4.03 M/UL (ref 4.2–5.4)
SIGNIFICANT IND 70042: NORMAL
SIGNIFICANT IND 70042: NORMAL
SITE SITE: NORMAL
SITE SITE: NORMAL
SODIUM SERPL-SCNC: 136 MMOL/L (ref 135–145)
SOURCE SOURCE: NORMAL
SOURCE SOURCE: NORMAL
WBC # BLD AUTO: 7.7 K/UL (ref 4.8–10.8)

## 2024-03-21 PROCEDURE — 700111 HCHG RX REV CODE 636 W/ 250 OVERRIDE (IP): Performed by: INTERNAL MEDICINE

## 2024-03-21 PROCEDURE — A9270 NON-COVERED ITEM OR SERVICE: HCPCS | Performed by: INTERNAL MEDICINE

## 2024-03-21 PROCEDURE — 700102 HCHG RX REV CODE 250 W/ 637 OVERRIDE(OP)

## 2024-03-21 PROCEDURE — 99239 HOSP IP/OBS DSCHRG MGMT >30: CPT | Performed by: INTERNAL MEDICINE

## 2024-03-21 PROCEDURE — 700102 HCHG RX REV CODE 250 W/ 637 OVERRIDE(OP): Performed by: INTERNAL MEDICINE

## 2024-03-21 PROCEDURE — 700102 HCHG RX REV CODE 250 W/ 637 OVERRIDE(OP): Performed by: HOSPITALIST

## 2024-03-21 PROCEDURE — 80053 COMPREHEN METABOLIC PANEL: CPT

## 2024-03-21 PROCEDURE — A9270 NON-COVERED ITEM OR SERVICE: HCPCS

## 2024-03-21 PROCEDURE — RXMED WILLOW AMBULATORY MEDICATION CHARGE: Performed by: INTERNAL MEDICINE

## 2024-03-21 PROCEDURE — A9270 NON-COVERED ITEM OR SERVICE: HCPCS | Performed by: HOSPITALIST

## 2024-03-21 PROCEDURE — 85027 COMPLETE CBC AUTOMATED: CPT

## 2024-03-21 PROCEDURE — 700101 HCHG RX REV CODE 250: Performed by: INTERNAL MEDICINE

## 2024-03-21 PROCEDURE — 82962 GLUCOSE BLOOD TEST: CPT

## 2024-03-21 RX ORDER — DIPHENHYDRAMINE HYDROCHLORIDE 25 MG/1
CAPSULE, LIQUID FILLED ORAL
Qty: 1 KIT | Refills: 0 | Status: SHIPPED | OUTPATIENT
Start: 2024-03-21

## 2024-03-21 RX ORDER — LINEZOLID 600 MG/1
600 TABLET, FILM COATED ORAL 2 TIMES DAILY
Qty: 16 TABLET | Refills: 0 | Status: ACTIVE | OUTPATIENT
Start: 2024-03-21 | End: 2024-03-29

## 2024-03-21 RX ORDER — OMEPRAZOLE 20 MG/1
20 CAPSULE, DELAYED RELEASE ORAL DAILY
Qty: 30 CAPSULE | Refills: 0 | Status: SHIPPED | OUTPATIENT
Start: 2024-03-21

## 2024-03-21 RX ORDER — LOSARTAN POTASSIUM 50 MG/1
50 TABLET ORAL DAILY
Qty: 30 TABLET | Refills: 0 | Status: SHIPPED | OUTPATIENT
Start: 2024-03-22

## 2024-03-21 RX ORDER — LANCETS 30 GAUGE
EACH MISCELLANEOUS
Qty: 100 EACH | Refills: 0 | Status: SHIPPED | OUTPATIENT
Start: 2024-03-21

## 2024-03-21 RX ORDER — INSULIN GLARGINE 100 [IU]/ML
25 INJECTION, SOLUTION SUBCUTANEOUS EVERY MORNING
Qty: 6 ML | Refills: 0 | Status: ACTIVE | OUTPATIENT
Start: 2024-03-21

## 2024-03-21 RX ORDER — GLUCOSAMINE HCL/CHONDROITIN SU 500-400 MG
CAPSULE ORAL
Qty: 100 EACH | Refills: 0 | Status: SHIPPED | OUTPATIENT
Start: 2024-03-21

## 2024-03-21 RX ORDER — INSULIN LISPRO 100 [IU]/ML
INJECTION, SOLUTION INTRAVENOUS; SUBCUTANEOUS
Qty: 9 ML | Refills: 0 | Status: ACTIVE | OUTPATIENT
Start: 2024-03-21

## 2024-03-21 RX ORDER — AMLODIPINE BESYLATE 5 MG/1
5 TABLET ORAL DAILY
Qty: 30 TABLET | Refills: 0 | Status: SHIPPED | OUTPATIENT
Start: 2024-03-22

## 2024-03-21 RX ADMIN — INSULIN LISPRO 6 UNITS: 100 INJECTION, SOLUTION INTRAVENOUS; SUBCUTANEOUS at 13:03

## 2024-03-21 RX ADMIN — POLYETHYLENE GLYCOL 3350 1 PACKET: 17 POWDER, FOR SOLUTION ORAL at 05:51

## 2024-03-21 RX ADMIN — OXYCODONE HYDROCHLORIDE 10 MG: 10 TABLET ORAL at 06:31

## 2024-03-21 RX ADMIN — OMEPRAZOLE 20 MG: 20 CAPSULE, DELAYED RELEASE ORAL at 05:48

## 2024-03-21 RX ADMIN — LOSARTAN POTASSIUM 50 MG: 50 TABLET, FILM COATED ORAL at 05:48

## 2024-03-21 RX ADMIN — ACETAMINOPHEN 650 MG: 325 TABLET, FILM COATED ORAL at 00:47

## 2024-03-21 RX ADMIN — NYSTATIN: 100000 POWDER TOPICAL at 05:50

## 2024-03-21 RX ADMIN — ACETAMINOPHEN 650 MG: 325 TABLET, FILM COATED ORAL at 06:32

## 2024-03-21 RX ADMIN — INSULIN LISPRO 6 UNITS: 100 INJECTION, SOLUTION INTRAVENOUS; SUBCUTANEOUS at 08:08

## 2024-03-21 RX ADMIN — OXYCODONE HYDROCHLORIDE 10 MG: 10 TABLET ORAL at 00:47

## 2024-03-21 RX ADMIN — OXYCODONE HYDROCHLORIDE 10 MG: 10 TABLET ORAL at 12:48

## 2024-03-21 RX ADMIN — INSULIN LISPRO 9 UNITS: 100 INJECTION, SOLUTION INTRAVENOUS; SUBCUTANEOUS at 12:59

## 2024-03-21 RX ADMIN — AMLODIPINE BESYLATE 5 MG: 5 TABLET ORAL at 05:48

## 2024-03-21 RX ADMIN — DOCUSATE SODIUM 50 MG AND SENNOSIDES 8.6 MG 2 TABLET: 8.6; 5 TABLET, FILM COATED ORAL at 00:47

## 2024-03-21 RX ADMIN — ACETAMINOPHEN 650 MG: 325 TABLET, FILM COATED ORAL at 13:04

## 2024-03-21 RX ADMIN — INSULIN GLARGINE-YFGN 25 UNITS: 100 INJECTION, SOLUTION SUBCUTANEOUS at 05:47

## 2024-03-21 RX ADMIN — INSULIN LISPRO 2 UNITS: 100 INJECTION, SOLUTION INTRAVENOUS; SUBCUTANEOUS at 08:08

## 2024-03-21 RX ADMIN — CEFAZOLIN 2 G: 10 INJECTION, POWDER, FOR SOLUTION INTRAVENOUS at 05:49

## 2024-03-21 ASSESSMENT — PAIN DESCRIPTION - PAIN TYPE
TYPE: ACUTE PAIN

## 2024-03-21 NOTE — CARE PLAN
The patient is Stable - Low risk of patient condition declining or worsening    Shift Goals  Clinical Goals: pain management; diabetes management & education  Patient Goals: pain management; diabetes education  Family Goals: diabetes education    Progress made toward(s) clinical / shift goals:    Problem: Pain - Standard  Goal: Alleviation of pain or a reduction in pain to the patient’s comfort goal  Outcome: Progressing     Problem: Knowledge Deficit - Standard  Goal: Patient and family/care givers will demonstrate understanding of plan of care, disease process/condition, diagnostic tests and medications  Outcome: Progressing     Problem: Respiratory  Goal: Patient will achieve/maintain optimum respiratory ventilation and gas exchange  Outcome: Progressing     Problem: Hemodynamics  Goal: Patient's hemodynamics, fluid balance and neurologic status will be stable or improve  Outcome: Progressing     Problem: Nutrition  Goal: Patient's nutritional and fluid intake will be adequate or improve  Outcome: Progressing     Problem: Fall Risk  Goal: Patient will remain free from falls  Outcome: Progressing     Problem: Skin Integrity  Goal: Skin integrity is maintained or improved  Outcome: Progressing       Patient is not progressing towards the following goals:

## 2024-03-21 NOTE — DIETARY
Nutrition Services: Diabetes Education Consult   Day 7 of admit.  Tamera Jha is a 48 y.o. female with admitting DX of DKA, type 2, not at goal (HCC) [E11.10]    RD consulted by diabetes educator, Maryann, to provide additional diet education. RD attempted to visit pt x3, pt unable to sit for verbal education at each attempt. On third attempt RD left handouts with family at bed side. Handouts include DKA handout, Carbohydrate Counting For People With Diabetes (2020), and 'diabetes food hub' website. Due to increased volume of consults today RD will be unable to return. Pt scheduled to DC today. RD to follow up as able if pt remains admitted.     No other education needs identified at this time. Consider referral to outpatient nutrition services for continuation of education as indicated or per pt preferences.     Please re-consult RD as indicated.

## 2024-03-21 NOTE — PROGRESS NOTES
Report received from night RN.    Pt Aox4, sitting up in bed. Reviewed plan of care with pt, pt verbalizes understanding. Pt requesting further DM education prior to discharge, DM educator notified via voalte. Pt has no complaints at this time.    Bed locked and at lowest level, call light in reach.

## 2024-03-21 NOTE — CARE PLAN
The patient is Stable - Low risk of patient condition declining or worsening    Shift Goals  Clinical Goals: pain control, DM education  Patient Goals: pain control, DM education  Family Goals: diabetes education    Progress made toward(s) clinical / shift goals:        Problem: Pain - Standard  Goal: Alleviation of pain or a reduction in pain to the patient’s comfort goal  Outcome: Progressing     Problem: Knowledge Deficit - Standard  Goal: Patient and family/care givers will demonstrate understanding of plan of care, disease process/condition, diagnostic tests and medications  Outcome: Progressing       Patient is not progressing towards the following goals:

## 2024-03-21 NOTE — PROGRESS NOTES
Pt discharged. IV removed, discharge instructions provided to patient, pt verbalizes understanding. Pt states all questions have been answered. Copy of discharge paperwork provided to pt, signed copy in chart. Pt states all belongings in possession. Pt left unit via walking.

## 2024-03-21 NOTE — DISCHARGE PLANNING
Meds-to-Beds: Discharge prescription orders listed below delivered to patient's bedside. RN notified. Patient counseled.  Patient elected to have payment for linezolid billed to patient account.     Patient educated to store pens not in use in the refrigerator. Current pen in use can be stored at room temperature for up to 28 days.      Current Outpatient Medications   Medication Sig Dispense Refill    insulin lispro (ADMELOG SOLOSTAR) 100 UNIT/ML SC SOPN injection PEN Use sliding scale for blood sugars before meals (three meals) and nightly.     Admin Instructions: For glucose: 70 - 150 mg/dL = 0 Units 151 - 200 mg/dL = 2 Units 201 - 250 mg/dL = 3 Units 251 - 300 mg/dL = 5 Units 301 - 350 mg/dL = 6 Units 351 - 400 mg/dL = 8 Units Over 400 mg/dL = 9 Units 9 mL 0    [START ON 3/22/2024] amLODIPine (NORVASC) 5 MG Tab Take 1 Tablet by mouth every day. 30 Tablet 0    linezolid (ZYVOX) 600 MG Tab Take 1 Tablet by mouth 2 times a day for 8 days. 16 Tablet 0    insulin glargine 100 UNIT/ML Solution Pen-injector injection Inject 25 Units under the skin every morning. 6 mL 0    omeprazole (PRILOSEC) 20 MG delayed-release capsule Take 1 Capsule by mouth every day. 30 Capsule 0    [START ON 3/22/2024] losartan (COZAAR) 50 MG Tab Take 1 Tablet by mouth every day. 30 Tablet 0    Blood Glucose Monitoring Suppl (BLOOD GLUCOSE MONITOR SYSTEM) w/Device Kit Test blood sugar as recommended by provider. 1 Kit 0    glucose blood strip Use one strip to test blood sugar three times daily before meals. 100 Strip 0    Lancets Use one lancet to test blood sugar three times daily before meals. 100 Each 0    Insulin Pen Needle 32 G x 4 mm Use one pen needle in pen device to inject insulin four times daily. 100 Each 0    Alcohol Swabs Wipe site with prep pad prior to injection. 100 Each 0      Snehal Santiago, PharmD

## 2024-03-21 NOTE — DISCHARGE SUMMARY
Discharge Summary    CHIEF COMPLAINT ON ADMISSION  Chief Complaint   Patient presents with    High Blood Sugar       Reason for Admission  ems     Admission Date  3/14/2024    CODE STATUS  Full Code    HPI & HOSPITAL COURSE  This is a 48 y.o. female who presented 3/14/2024 with with a history of diabetes, apparently previously only on metformin, inconsistent outpatient treatment, the patient presents with DKA and sepsis due to group B streptococcus bacteremia, the patient admitted to the ICU level of care, she had an ultrasound of the abdomen showing some fatty liver infiltration likely, CT of the abdomen consistent with right-sided pyelonephritis, uterine fibroid, ultrasound of the heart TTE without evidence of endocarditis, infectious disease consulted, in light of the patient's arthralgias, back and chest pain a MRI of the C and T-spine ordered, the patient concerned about thrush, difficulty swallowing, the patient is interested in further diabetic management and teaching and outpatient follow-up, she has been in transition to moved to [place] from [place], the transition apparently had some impact on her medical care.   She was started on ancef initially. Her MRI was negative for osteomyelitis.   Her repeat blood culture have remained negative.  Infection disease has recommended linezolid with stop date at 3/29.  Regarding her diabetes she has been started on insulin and her blood sugar ar now better controlled. She was sen by diabetic educator   Patient now doing better will arrange meds to bed and also diabetic supply for patient and will discharge her home today     The patient met 2-midnight criteria for an inpatient stay at the time of discharge.    Discharge Date  3/21/24    FOLLOW UP ITEMS POST DISCHARGE  PCP    DISCHARGE DIAGNOSES  Principal Problem:    DKA, type 2, not at goal (HCC) (POA: Yes)  Active Problems:    Poor compliance (POA: Yes)    Transaminitis (POA: Yes)    Sepsis (HCC) (POA: Yes)    Blood  in stool (POA: Yes)    Pyelonephritis (POA: Yes)    Bacteremia due to group B Streptococcus (POA: Yes)    Vaginal yeast infection (POA: Yes)    Uterine fibroid (POA: Unknown)    HTN (hypertension) (POA: Unknown)  Resolved Problems:    Acidosis due to type 2 diabetes mellitus (HCC) (POA: Yes)      FOLLOW UP  Future Appointments   Date Time Provider Department Center   3/25/2024  1:00 PM Armando R Reyes Yparraguirre, M.D. [facility]     No follow-up provider specified.    MEDICATIONS ON DISCHARGE     Medication List        START taking these medications        Instructions   Alcohol Swabs   Doctor's comments: Per formulary preference. ICD-10 code: E11.65 Uncontrolled type 2 Diabetes Mellitus  Wipe site with prep pad prior to injection.     amLODIPine 5 MG Tabs  Start taking on: March 22, 2024  Commonly known as: Norvasc   Take 1 Tablet by mouth every day.  Dose: 5 mg     Blood Glucose Monitor System w/Device Kit   Doctor's comments: Or per formulary preference. ICD-10 code: E11.65 Uncontrolled type 2 Diabetes Mellitus  Test blood sugar as recommended by provider.     glucose blood strip   Doctor's comments: Or per formulary preference. ICD-10 code: E11.65 Uncontrolled type 2 Diabetes Mellitus  Use one strip to test blood sugar three times daily before meals.     insulin glargine 100 UNIT/ML injection PEN  Generic drug: insulin glargine   Inject 25 Units under the skin every morning.  Dose: 25 Units     insulin lispro 100 UNIT/ML Sopn injection PEN  Commonly known as: Admelog SoloStar   Use sliding scale for blood sugars before meals (three meals) and nightly.     Admin Instructions: For glucose: 70 - 150 mg/dL = 0 Units 151 - 200 mg/dL = 2 Units 201 - 250 mg/dL = 3 Units 251 - 300 mg/dL = 5 Units 301 - 350 mg/dL = 6 Units 351 - 400 mg/dL = 8 Units Over 400 mg/dL = 9 Units     Insulin Pen Needle 32 G x 4 mm   Doctor's comments: Per patient/formulary preference. ICD-10 code: E11.65 Uncontrolled type 2 Diabetes  Mellitus  Use one pen needle in pen device to inject insulin four times daily.     Lancets   Doctor's comments: Or per formulary preference. ICD-10 code: E11.65 Uncontrolled type 2 Diabetes Mellitus  Use one lancet to test blood sugar three times daily before meals.     linezolid 600 MG Tabs  Commonly known as: Zyvox   Take 1 Tablet by mouth 2 times a day for 8 days.  Dose: 600 mg     losartan 50 MG Tabs  Start taking on: March 22, 2024  Commonly known as: Cozaar   Take 1 Tablet by mouth every day.  Dose: 50 mg     omeprazole 20 MG delayed-release capsule  Commonly known as: PriLOSEC   Take 1 Capsule by mouth every day.  Dose: 20 mg            STOP taking these medications      Insulin Glargine-yfgn 100 UNIT/ML Sopn              Allergies  Allergies   Allergen Reactions    Levaquin [Levofloxacin] Hives       DIET  Orders Placed This Encounter   Procedures    Diet Order Diet: Level 6 - Soft and Bite Sized; Liquid level: Level 0 - Thin; Tray Modifications (optional): Double Portions     Standing Status:   Standing     Number of Occurrences:   1     Order Specific Question:   Diet:     Answer:   Level 6 - Soft and Bite Sized [23]     Order Specific Question:   Liquid level     Answer:   Level 0 - Thin     Order Specific Question:   Tray Modifications (optional)     Answer:   Double Portions       ACTIVITY  As tolerated.  Weight bearing as tolerated    CONSULTATIONS  Infection disease     PROCEDURES  None     LABORATORY  Lab Results   Component Value Date    SODIUM 136 03/21/2024    POTASSIUM 4.0 03/21/2024    CHLORIDE 102 03/21/2024    CO2 23 03/21/2024    GLUCOSE 164 (H) 03/21/2024    BUN 8 03/21/2024    CREATININE 0.45 (L) 03/21/2024    CREATININE 1.4 09/07/2005        Lab Results   Component Value Date    WBC 7.7 03/21/2024    HEMOGLOBIN 12.2 03/21/2024    HEMATOCRIT 35.7 (L) 03/21/2024    PLATELETCT 278 03/21/2024        Total time of the discharge process exceeds 35 minutes.

## 2024-03-22 NOTE — PROGRESS NOTES
Diabetes education: Met with pt and SO to answer nutrition questions. Pt thought she would need to count carbs and give insulin according to the ratio. Explained controlled carbs and portion control but she was to dc on a  sliding scale coverage ( ac only not hs), and long acting insulin. Reviewed meter verbally. Pt denied need in setting up meter. Awaiting discharge.

## 2024-03-25 ENCOUNTER — APPOINTMENT (OUTPATIENT)
Dept: INTERNAL MEDICINE | Facility: OTHER | Age: 49
End: 2024-03-25
Payer: COMMERCIAL

## 2025-08-29 ENCOUNTER — HOSPITAL ENCOUNTER (OUTPATIENT)
Facility: MEDICAL CENTER | Age: 50
End: 2025-08-29
Attending: FAMILY MEDICINE
Payer: MEDICAID

## 2025-08-29 VITALS — HEIGHT: 65 IN | BODY MASS INDEX: 31.65 KG/M2 | WEIGHT: 190 LBS

## 2025-08-29 DIAGNOSIS — Z12.31 SCREENING MAMMOGRAM, ENCOUNTER FOR: ICD-10-CM

## 2025-08-29 PROCEDURE — 77067 SCR MAMMO BI INCL CAD: CPT

## 2025-08-29 ASSESSMENT — FIBROSIS 4 INDEX: FIB4 SCORE: 1.48
